# Patient Record
Sex: MALE | Race: WHITE | Employment: FULL TIME | URBAN - METROPOLITAN AREA
[De-identification: names, ages, dates, MRNs, and addresses within clinical notes are randomized per-mention and may not be internally consistent; named-entity substitution may affect disease eponyms.]

---

## 2017-03-20 NOTE — PROGRESS NOTES
SUBJECTIVE:                                                    Navin Thomas is a 21 year old male who presents to clinic today for the following health issues:    ENT Symptoms             Symptoms: cc Present Absent Comment   Fever/Chills  x     Fatigue  x     Muscle Aches  x     Eye Irritation   x    Sneezing   x    Nasal Chava/Drg  x     Sinus Pressure/Pain  x     Loss of smell  x     Dental pain  x     Sore Throat  x     Swollen Glands  x     Ear Pain/Fullness   x    Cough  x     Wheeze   x    Chest Pain   x    Shortness of breath   x    Rash   x    Other   x      Symptom duration:  x 2 days   Symptom severity:  mild   Treatments tried:  none   Contacts:  none       Only very mild cough.   Not eating much.  Drinking hurts even. Taking ibuprofen.   Only low grade fevers.     No history of mono.   Only mild body aches.   Does have a runny nose.     No difficulty breathing.     Problem list and histories reviewed & adjusted, as indicated.  Additional history: as documented    There is no problem list on file for this patient.    History reviewed. No pertinent past surgical history.    Social History   Substance Use Topics     Smoking status: Never Smoker     Smokeless tobacco: Never Used     Alcohol use No     Family History   Problem Relation Age of Onset     Hypertension Father      Lipids Father      Hearing Loss Father      Hearing Loss Paternal Grandmother      Hearing Loss Paternal Grandfather          No current outpatient prescriptions on file.     Allergies   Allergen Reactions     Aloe Itching       ROS:  Constitutional, HEENT, cardiovascular, pulmonary, gi and gu systems are negative, except as otherwise noted.    OBJECTIVE:                                                    /84  Pulse 81  Temp 99.4  F (37.4  C) (Oral)  Ht 6' (1.829 m)  Wt 223 lb (101.2 kg)  SpO2 95%  BMI 30.24 kg/m2  Body mass index is 30.24 kg/(m^2).  GENERAL:  No acute distress.  Interacts appropriately.  Breathing without  difficulty.  Alert.  HEENT:  Tympanic membranes intact without effusion or erythema.  Oral mucosa moist. Posterior pharynx has erythema.  Posterior pharynx has no exudate. Tonsils are 3 plus and every edematous appearing. Uvula midline.   NECK:  Soft and supple.  without tenderness.  Without lymphadenopathy.  Normal range of motion.    CARDIAC:   Regular rate and rhythm.  No murmurs, rubs, or gallops.   PULMONARY: Clear to auscultation bilaterally.  No  wheezes, crackles, or rhonchi.  Normal air exchange/breath sounds.  No use of accessory muscles.    PSYCH: Normal affect.  SKIN: No rashes.        Results for orders placed or performed in visit on 03/21/17 (from the past 24 hour(s))   Strep, Rapid Screen   Result Value Ref Range    Specimen Description Throat     Rapid Strep A Screen (A)      POSITIVE: Group A Streptococcal antigen detected by immunoassay.    Micro Report Status FINAL 03/21/2017           ASSESSMENT/PLAN:                                                    ASSESSMENT / PLAN:  (R07.0) Throat pain  (primary encounter diagnosis)  Comment:   Plan: Strep, Rapid Screen, predniSONE (DELTASONE) 20         MG tablet            (J02.0) Streptococcal pharyngitis  Comment:   Plan: amoxicillin (AMOXIL) 875 MG tablet, predniSONE         (DELTASONE) 20 MG tablet            To emergency room with difficulty breathing    Take with food. Side effects discussed.  Call with worsening symptoms or if no improvement in 5 days.  Analgesics for pain with food as needed.      Shanice Villagran PA-C  [facility]

## 2017-03-21 ENCOUNTER — OFFICE VISIT (OUTPATIENT)
Dept: FAMILY MEDICINE | Facility: CLINIC | Age: 22
End: 2017-03-21
Payer: COMMERCIAL

## 2017-03-21 VITALS
OXYGEN SATURATION: 95 % | DIASTOLIC BLOOD PRESSURE: 84 MMHG | SYSTOLIC BLOOD PRESSURE: 134 MMHG | BODY MASS INDEX: 30.2 KG/M2 | WEIGHT: 223 LBS | HEIGHT: 72 IN | TEMPERATURE: 99.4 F | HEART RATE: 81 BPM

## 2017-03-21 DIAGNOSIS — J02.0 STREPTOCOCCAL PHARYNGITIS: ICD-10-CM

## 2017-03-21 DIAGNOSIS — R07.0 THROAT PAIN: Primary | ICD-10-CM

## 2017-03-21 LAB
DEPRECATED S PYO AG THROAT QL EIA: ABNORMAL
MICRO REPORT STATUS: ABNORMAL
SPECIMEN SOURCE: ABNORMAL

## 2017-03-21 PROCEDURE — 99213 OFFICE O/P EST LOW 20 MIN: CPT | Performed by: PHYSICIAN ASSISTANT

## 2017-03-21 PROCEDURE — 87880 STREP A ASSAY W/OPTIC: CPT | Performed by: PHYSICIAN ASSISTANT

## 2017-03-21 RX ORDER — AMOXICILLIN 875 MG
875 TABLET ORAL 2 TIMES DAILY
Qty: 20 TABLET | Refills: 0 | Status: SHIPPED | OUTPATIENT
Start: 2017-03-21 | End: 2017-04-17

## 2017-03-21 RX ORDER — PREDNISONE 20 MG/1
40 TABLET ORAL DAILY
Qty: 10 TABLET | Refills: 0 | Status: SHIPPED | OUTPATIENT
Start: 2017-03-21 | End: 2017-03-26

## 2017-03-21 NOTE — NURSING NOTE
Chief Complaint   Patient presents with     URI     cold SX per pt x 2 days now        Initial /84  Pulse 81  Temp 99.4  F (37.4  C) (Oral)  Ht 6' (1.829 m)  Wt 223 lb (101.2 kg)  SpO2 95%  BMI 30.24 kg/m2 Estimated body mass index is 30.24 kg/(m^2) as calculated from the following:    Height as of this encounter: 6' (1.829 m).    Weight as of this encounter: 223 lb (101.2 kg).  Medication Reconciliation: complete      Tulio Antunez MA

## 2017-03-21 NOTE — MR AVS SNAPSHOT
"              After Visit Summary   3/21/2017    Navin Thomas    MRN: [id number]           Patient Information     Date Of Birth          1995        Visit Information        Provider Department      3/21/2017 9:20 AM Shanice Villagran PA-C [facility]        Today's Diagnoses     Throat pain    -  1    Streptococcal pharyngitis           Follow-ups after your visit        Who to contact     If you have questions or need follow up information about today's clinic visit or your schedule please contact [facility] directly at [phone].  Normal or non-critical lab and imaging results will be communicated to you by [facility] Labshart, letter or phone within 4 business days after the clinic has received the results. If you do not hear from us within 7 days, please contact the clinic through Th[facility] Labshart or phone. If you have a critical or abnormal lab result, we will notify you by phone as soon as possible.  Submit refill requests through GameHuddle or call your pharmacy and they will forward the refill request to us. Please allow 3 business days for your refill to be completed.          Additional Information About Your Visit        MyChart Information     GameHuddle lets you send messages to your doctor, view your test results, renew your prescriptions, schedule appointments and more. To sign up, go to [email or web address] . Click on \"Log in\" on the left side of the screen, which will take you to the Welcome page. Then click on \"Sign up Now\" on the right side of the page.     You will be asked to enter the access code listed below, as well as some personal information. Please follow the directions to create your username and password.     Your access code is: [email or web address]  Expires: 2017  9:53 AM     Your access code will  in 90 days. If you need help or a new code, please call your [facility] or [phone].        Care EveryWhere ID     This is your Care EveryWhere ID. " This could be used by other organizations to access your [place] medical records  [id number]        Your Vitals Were     Pulse Temperature Height Pulse Oximetry BMI (Body Mass Index)       81 99.4  F (37.4  C) (Oral) 6' (1.829 m) 95% 30.24 kg/m2        Blood Pressure from Last 3 Encounters:   03/21/17 134/84   09/20/16 (!) 145/91   12/21/15 130/80    Weight from Last 3 Encounters:   03/21/17 223 lb (101.2 kg)   09/20/16 217 lb (98.4 kg)   12/21/15 238 lb (108 kg)              We Performed the Following     Strep, Rapid Screen          Today's Medication Changes          These changes are accurate as of: 3/21/17  9:53 AM.  If you have any questions, ask your nurse or doctor.               Start taking these medicines.        Dose/Directions    amoxicillin 875 MG tablet   Commonly known as:  AMOXIL   Used for:  Streptococcal pharyngitis   Started by:  Shanice Villagran PA-C        Dose:  875 mg   Take 1 tablet (875 mg) by mouth 2 times daily   Quantity:  20 tablet   Refills:  0       predniSONE 20 MG tablet   Commonly known as:  DELTASONE   Used for:  Streptococcal pharyngitis, Throat pain   Started by:  Shanice Villagran PA-C        Dose:  40 mg   Take 2 tablets (40 mg) by mouth daily for 5 days In the morning. With food.   Quantity:  10 tablet   Refills:  0            Where to get your medicines      These medications were sent to [facility] [address] [address]  [address] [address]     Phone:  [phone]     amoxicillin 875 MG tablet    predniSONE 20 MG tablet                Primary Care Provider Office Phone # Fax #    [facility] [phone] [address] [facility]. [place] [address]        Thank you!     Thank you for choosing [facility]  for your care. Our goal is always to provide you with excellent care. Hearing back from our patients is one way we can continue to improve our  services. Please take a few minutes to complete the written survey that you may receive in the mail after your visit with us. Thank you!             Your Updated Medication List - Protect others around you: Learn how to safely use, store and throw away your medicines at www.disposemymeds.org.          This list is accurate as of: 3/21/17  9:53 AM.  Always use your most recent med list.                   Brand Name Dispense Instructions for use    amoxicillin 875 MG tablet    AMOXIL    20 tablet    Take 1 tablet (875 mg) by mouth 2 times daily       predniSONE 20 MG tablet    DELTASONE    10 tablet    Take 2 tablets (40 mg) by mouth daily for 5 days In the morning. With food.

## 2017-04-17 ENCOUNTER — RADIANT APPOINTMENT (OUTPATIENT)
Dept: GENERAL RADIOLOGY | Facility: CLINIC | Age: 22
End: 2017-04-17
Attending: PHYSICIAN ASSISTANT
Payer: COMMERCIAL

## 2017-04-17 ENCOUNTER — OFFICE VISIT (OUTPATIENT)
Dept: FAMILY MEDICINE | Facility: CLINIC | Age: 22
End: 2017-04-17
Payer: COMMERCIAL

## 2017-04-17 VITALS
SYSTOLIC BLOOD PRESSURE: 132 MMHG | HEART RATE: 68 BPM | TEMPERATURE: 98.2 F | BODY MASS INDEX: 30.79 KG/M2 | OXYGEN SATURATION: 97 % | DIASTOLIC BLOOD PRESSURE: 81 MMHG | WEIGHT: 227 LBS

## 2017-04-17 DIAGNOSIS — J30.1 SEASONAL ALLERGIC RHINITIS DUE TO POLLEN: ICD-10-CM

## 2017-04-17 DIAGNOSIS — M25.562 LEFT KNEE PAIN, UNSPECIFIED CHRONICITY: Primary | ICD-10-CM

## 2017-04-17 PROCEDURE — 73562 X-RAY EXAM OF KNEE 3: CPT | Mod: LT

## 2017-04-17 PROCEDURE — 99214 OFFICE O/P EST MOD 30 MIN: CPT | Performed by: PHYSICIAN ASSISTANT

## 2017-04-17 RX ORDER — FEXOFENADINE HCL AND PSEUDOEPHEDRINE HCL 180; 240 MG/1; MG/1
1 TABLET, EXTENDED RELEASE ORAL DAILY
Qty: 30 TABLET | Refills: 0 | Status: SHIPPED | OUTPATIENT
Start: 2017-04-17 | End: 2018-05-21

## 2017-04-17 ASSESSMENT — PAIN SCALES - GENERAL: PAINLEVEL: NO PAIN (1)

## 2017-04-17 NOTE — MR AVS SNAPSHOT
After Visit Summary   4/17/2017    Navin Thomas    MRN: [id number]           Patient Information     Date Of Birth          1995        Visit Information        Provider Department      4/17/2017 8:20 AM Angélica Hernandez PA-C [facility]        Today's Diagnoses     Left knee pain, unspecified chronicity    -  1    Seasonal allergic rhinitis due to pollen          Care Instructions    Rest, ice, and elevate the left knee multiple times daily for the next 2-3 days.     Then, slowly increase activities as tolerated. If something causes you pain, you are doing too much.    Alternate motrin and tylenol as needed for discomfort.    Follow up with orthopedics for further evaluation of your knee pain. Sooner if any worsening of symptoms.    Follow up with allergist for further evaluation of your allergy symptoms. Recommend using flonase as directed over the counter daily to help minimize symptoms.              Follow-ups after your visit        Additional Services     ALLERGY/ASTHMA ADULT REFERRAL       Your provider has referred you to: FMG: [facility]  [phone] [email or web address]://[email or web address].[facility]/[place]/  FHN: [facility] - [place] [phone]  Fax:  [phone] [email or web address]  FHN: [facility] [place] [phone]   [email or web address]  [place] [phone]   http://[email or web address]  [place] [phone]   http://[email or web address]  FHN: [facility] [place] [phone]   https://www.[email or web address].net/  [place] [phone]   [email or web address]  FHN: [facility] - [place] [phone]   http://[email or web address]  [place] [phone]   http://Light Extraction  [place] [phone]   [email or web address]    Please be aware that coverage of these services is subject to the terms and  limitations of your health insurance plan.  Call member services at your health plan with any benefit or coverage questions.      Please bring the following with you to your appointment:    (1) Any X-Rays, CTs or MRIs which have been performed.  Contact the facility where they were done to arrange for  prior to your scheduled appointment.    (2) List of current medications  (3) This referral request   (4) Any documents/labs given to you for this referral            ORTHOPEDICS ADULT REFERRAL       Your provider has referred you to: FMG: [facility] - [facility] [phone]   http://www.[place].[facility]/  [facility] [phone]   http://www.[place].[facility]/  UM: Orthopaedic [facility] [phone]   http://www.[facility].org/Clinics/orthopaedic-clinic/    UMP: Sports Medicine [facility] [phone]   http://www.[facility].org/specialties/sports-medicine/  [facility] [phone]   https://[email or web address].[email or web address]/[facility]    Please be aware that coverage of these services is subject to the terms and limitations of your health insurance plan.  Call member services at your health plan with any benefit or coverage questions.      Please bring the following to your appointment:    >>   Any x-rays, CTs or MRIs which have been performed.  Contact the facility where they were done to arrange for  prior to your scheduled appointment.    >>   List of current medications   >>   This referral request   >>   Any documents/labs given to you for this referral                  Who to contact     If you have questions or need follow up information about today's clinic visit or your schedule please contact [facility] directly at [phone].  Normal or non-critical lab and imaging  results will be communicated to you by MyChart, letter or phone within 4 business days after the clinic has received the results. If you do not hear from us within 7 days, please contact the clinic through MyChart or phone. If you have a critical or abnormal lab result, we will notify you by phone as soon as possible.  Submit refill requests through Printed Piece or call your pharmacy and they will forward the refill request to us. Please allow 3 business days for your refill to be completed.          Additional Information About Your Visit        Care EveryWhere ID     This is your Care EveryWhere ID. This could be used by other organizations to access your [place] medical records  [id number]        Your Vitals Were     Pulse Temperature Pulse Oximetry BMI (Body Mass Index)          68 98.2  F (36.8  C) (Oral) 97% 30.79 kg/m2         Blood Pressure from Last 3 Encounters:   04/17/17 132/81   03/21/17 134/84   09/20/16 (!) 145/91    Weight from Last 3 Encounters:   04/17/17 227 lb (103 kg)   03/21/17 223 lb (101.2 kg)   09/20/16 217 lb (98.4 kg)              We Performed the Following     ALLERGY/ASTHMA ADULT REFERRAL     ORTHOPEDICS ADULT REFERRAL     XR Knee Left 3 Views          Today's Medication Changes          These changes are accurate as of: 4/17/17  8:48 AM.  If you have any questions, ask your nurse or doctor.               Start taking these medicines.        Dose/Directions    fexofenadine-pseudoePHEDrine 180-240 MG per 24 hr tablet   Commonly known as:  ALLEGRA-D 24   Used for:  Seasonal allergic rhinitis due to pollen   Started by:  Angélica Hernandez PA-C        Dose:  1 tablet   Take 1 tablet by mouth daily   Quantity:  30 tablet   Refills:  0         Stop taking these medicines if you haven't already. Please contact your care team if you have questions.     amoxicillin 875 MG tablet   Commonly known as:  AMOXIL   Stopped by:  Angélica Hernandez PA-C                Where to get your medicines       Some of these will need a paper prescription and others can be bought over the counter.  Ask your nurse if you have questions.     Bring a paper prescription for each of these medications     fexofenadine-pseudoePHEDrine 180-240 MG per 24 hr tablet                Primary Care Provider Office Phone # Fax #    [facility] [phone] [address] [facility] [address]        Thank you!     Thank you for choosing [facility]  for your care. Our goal is always to provide you with excellent care. Hearing back from our patients is one way we can continue to improve our services. Please take a few minutes to complete the written survey that you may receive in the mail after your visit with us. Thank you!             Your Updated Medication List - Protect others around you: Learn how to safely use, store and throw away your medicines at www.disposemymeds.org.          This list is accurate as of: 4/17/17  8:48 AM.  Always use your most recent med list.                   Brand Name Dispense Instructions for use    fexofenadine-pseudoePHEDrine 180-240 MG per 24 hr tablet    ALLEGRA-D 24    30 tablet    Take 1 tablet by mouth daily

## 2017-04-17 NOTE — NURSING NOTE
Chief Complaint   Patient presents with     Knee Pain     left knee pain off and on 6 years - getting worse       Initial /81  Pulse 68  Temp 98.2  F (36.8  C) (Oral)  Wt 227 lb (103 kg)  SpO2 97%  BMI 30.79 kg/m2 Estimated body mass index is 30.79 kg/(m^2) as calculated from the following:    Height as of 3/21/17: 6' (1.829 m).    Weight as of this encounter: 227 lb (103 kg).  Medication Reconciliation: complete  Katharina Coelho M.A.

## 2017-04-17 NOTE — PROGRESS NOTES
SUBJECTIVE:                                                    Navin Thomas is a 21 year old male who presents to clinic today for the following health issues:        Musculoskeletal problem/pain      Duration: left knee pain x 6 years     Description  Location:  Left knee    Intensity:  moderate, severe    Accompanying signs and symptoms: none    History  Previous similar problem: YES  Previous evaluation:  MRI    Precipitating or alleviating factors:  Trauma or overuse: YES-   Aggravating factors include: climbing stairs, lifting, exercise and running    Therapies tried and outcome: ice and PT - 3 years ago     MRI and patient 3 years ago. Symptoms worsening since 3 years ago.     Locates pain to the medial aspect of the knee.  With range of motion he feels clicking, popping, and grinding sensation. Denies the knee locking place. Continues with full range of motion. Intermittently limping due to pain.   First injury - forgot to pivot foot and bearing weight quickly - 6 years ago.  States he is re-injuring the knee every 2-6 months.  Most recently injured 3 months ago - roommate bumped into knee and caused him to fall down to his knees.  He will have intermittent swelling. Did have slight ecchymosis after most recent injury. Denies erythema  Denies numbness and tingling.     He would like a refill of Allegra-D. States this works the best to control his allergy symptoms (nasal congestion and itchy eyes). He has not seen an allergist. States he is allergic to pollen. He has tried Claritin in the past, but this failed over time for him. Discussed concerns for taking a decongestant daily. Recommended follow up with allergist for further evaluation and care of his symptoms.         Problem list and histories reviewed & adjusted, as indicated.  Additional history: as documented    There is no problem list on file for this patient.    No past surgical history on file.    Social History   Substance Use Topics      Smoking status: Never Smoker     Smokeless tobacco: Never Used     Alcohol use No     Family History   Problem Relation Age of Onset     Hypertension Father      Lipids Father      Hearing Loss Father      Hearing Loss Paternal Grandmother      Hearing Loss Paternal Grandfather          Current Outpatient Prescriptions   Medication Sig Dispense Refill     fexofenadine-pseudoePHEDrine (ALLEGRA-D 24) 180-240 MG per 24 hr tablet Take 1 tablet by mouth daily 30 tablet 0     Allergies   Allergen Reactions     Aloe Itching     BP Readings from Last 3 Encounters:   04/17/17 132/81   03/21/17 134/84   09/20/16 (!) 145/91    Wt Readings from Last 3 Encounters:   04/17/17 227 lb (103 kg)   03/21/17 223 lb (101.2 kg)   09/20/16 217 lb (98.4 kg)                    Reviewed and updated as needed this visit by clinical staff  Allergies       Reviewed and updated as needed this visit by Provider         ROS:  Constitutional, musculoskeletal and integumentary systems are negative, except as otherwise noted.    OBJECTIVE:                                                    /81  Pulse 68  Temp 98.2  F (36.8  C) (Oral)  Wt 227 lb (103 kg)  SpO2 97%  BMI 30.79 kg/m2  Body mass index is 30.79 kg/(m^2).  GENERAL: healthy, alert and no distress  HENT: normal cephalic/atraumatic, ear canals and TM's normal, nose and mouth without ulcers or lesions, oropharynx clear and oral mucous membranes moist  MS: Left Knee - mild tenderness to palpation along the anterior-medial aspect, minimal swelling, good range of motion with mild pain at full extension, negative anterior and posterior drawer, minimal pain but no laxity with valgus stress, increased pain with thessaly test, no pain or laxity with varus stress, ambulating without a limp, normal sensation and capillary refill  SKIN: no suspicious lesions or rashes    Diagnostic Test Results:  Results for orders placed or performed in visit on 04/17/17 (from the past 24 hour(s))   XR Knee  Left 3 Views    Narrative    LEFT KNEE THREE VIEWS  4/17/2017 8:44 AM     HISTORY: Pain in left knee.    COMPARISON: None.      Impression    IMPRESSION: Bones are normally aligned. Trace left knee effusion. No  acute fracture. Well-corticated ossicles are noted at the medial  aspect of the patella, perhaps incidental, or perhaps related to old  injury. No evidence of acute fracture. There may be minimal associated  edema, clinically correlate with pain localized to this region.    TERESITA LEMON MD         ASSESSMENT/PLAN:                                                        ICD-10-CM    1. Left knee pain, unspecified chronicity M25.562 XR Knee Left 3 Views     ORTHOPEDICS ADULT REFERRAL   2. Seasonal allergic rhinitis due to pollen J30.1 fexofenadine-pseudoePHEDrine (ALLEGRA-D 24) 180-240 MG per 24 hr tablet     ALLERGY/ASTHMA ADULT REFERRAL       Patient Instructions   Rest, ice, and elevate the left knee multiple times daily for the next 2-3 days.     Then, slowly increase activities as tolerated. If something causes you pain, you are doing too much.    Alternate motrin and tylenol as needed for discomfort.    Follow up with orthopedics for further evaluation of your knee pain. Sooner if any worsening of symptoms.    Follow up with allergist for further evaluation of your allergy symptoms. Recommend using flonase as directed over the counter daily to help minimize symptoms.            Angélica Hernandez PA-C  [facility]

## 2017-04-17 NOTE — PATIENT INSTRUCTIONS
Rest, ice, and elevate the left knee multiple times daily for the next 2-3 days.     Then, slowly increase activities as tolerated. If something causes you pain, you are doing too much.    Alternate motrin and tylenol as needed for discomfort.    Follow up with orthopedics for further evaluation of your knee pain. Sooner if any worsening of symptoms.    Follow up with allergist for further evaluation of your allergy symptoms. Recommend using flonase as directed over the counter daily to help minimize symptoms.

## 2017-04-18 ENCOUNTER — PRE VISIT (OUTPATIENT)
Dept: ORTHOPEDICS | Facility: CLINIC | Age: 22
End: 2017-04-18

## 2017-04-18 NOTE — TELEPHONE ENCOUNTER
1.  Date/reason for appt: 4/24/17 -- left knee pain  2.  Referring provider: Angélica Hernandez  3.  Call to patient (Yes / No - short description): no, referred  4.  Previous care at / records requested from: [facility] -- records and imaging in epic/pacs.   5.  Other: Per Dr. Hernandez's note, pt has had MRI in the past.  Called and spoke to pt, he has not been seen for this anywhere except for [facility].  Closing encounter.

## 2017-04-20 ENCOUNTER — TRANSFERRED RECORDS (OUTPATIENT)
Dept: HEALTH INFORMATION MANAGEMENT | Facility: CLINIC | Age: 22
End: 2017-04-20

## 2017-04-24 ENCOUNTER — OFFICE VISIT (OUTPATIENT)
Dept: ORTHOPEDICS | Facility: CLINIC | Age: 22
End: 2017-04-24

## 2017-04-24 VITALS
SYSTOLIC BLOOD PRESSURE: 141 MMHG | OXYGEN SATURATION: 99 % | WEIGHT: 230.6 LBS | HEIGHT: 72 IN | BODY MASS INDEX: 31.23 KG/M2 | HEART RATE: 78 BPM | DIASTOLIC BLOOD PRESSURE: 88 MMHG

## 2017-04-24 DIAGNOSIS — M25.562 ACUTE PAIN OF LEFT KNEE: Primary | ICD-10-CM

## 2017-04-24 ASSESSMENT — ENCOUNTER SYMPTOMS
STIFFNESS: 0
BACK PAIN: 0
MUSCLE CRAMPS: 0
NECK PAIN: 0
ARTHRALGIAS: 1
MYALGIAS: 0
JOINT SWELLING: 1
MUSCLE WEAKNESS: 1

## 2017-04-24 NOTE — NURSING NOTE
"Reason For Visit:   Chief Complaint   Patient presents with     Consult     Pt. states that he is here today for Left Knee Pain. DOI: 6 years ago, martial arts. He had tried Physical Therapy in the past, affected for couple of days. Referring:  JESSY ISAAC       Pain Assessment  Patient Currently in Pain: No  0-10 Pain Scale: 0  Primary Pain Location: Knee  Pain Orientation: Left               HEIGHT: 5' 11.5\", WEIGHT: 230 lbs 9.6 oz, BMI: Body mass index is 31.71 kg/(m^2).      Current Outpatient Prescriptions   Medication Sig Dispense Refill     fexofenadine-pseudoePHEDrine (ALLEGRA-D 24) 180-240 MG per 24 hr tablet Take 1 tablet by mouth daily 30 tablet 0          Allergies   Allergen Reactions     Aloe Itching     "

## 2017-04-24 NOTE — PROGRESS NOTES
"Subjective  Navin Thomas is a 21 year old male who presents with the complaint of   L knee pain injured dancing 6 weeks ago. Previous knee pain 6 years ago with matial arts and had an injury. Thought he has been subluxing his knee.  He continued to have episodes of pain every 3-6 months. Most recent 6 weeks ago. No pop. He gets pain after reinjuring the knee.    Pain and nonpainful clicking. + giving way episodes on the l leg with flexion.  Limiting from running, also limited weightbearing on his L leg.    Activities:  School at [employer].        Past Medical and Surgical History  Past Medical History:   Diagnosis Date     NO ACTIVE PROBLEMS             Allergies   Allergen Reactions     Aloe Itching     Current Outpatient Prescriptions   Medication Sig Dispense Refill     fexofenadine-pseudoePHEDrine (ALLEGRA-D 24) 180-240 MG per 24 hr tablet Take 1 tablet by mouth daily 30 tablet 0     Family Hx remarkable for   Navin Thomas does not use tobacco      ROS:  Constitutional no fevers sweats or chills  Eyes no vision change  Respiratory, no sob cough wheezing,   Cardiovascular no syncope, chest pain or palpitaitons,   Gastroenterology, no nausea, vomiting or abd pain, no stool incontinence  Genitourinary, no dysuria, no  Frequency, no urinary incontinence, no urinary retention  Integumentary-no recent rashes  Musculoskeletal see HPI otherwise negative  Psychiatric no depression or anxiety  Heme-no abnormal bleeding      Objective  /88  Pulse 78  Ht 5' 11.5\" (1.816 m)  Wt 230 lb 9.6 oz (104.6 kg)  SpO2 99%  BMI 31.71 kg/m2      left  Knee Exam  No joint effusion, No redness;Tenderness medial joint line ;ROM unremarkable; Strength 5/5 ext, 5/5 flexion, +  pain ,no  Opening at medial joint line with varus or neg pain/opening with valgus stress test;  Lachmans with less firm endpoint of L than R; painful McMurrays medial joint line; pos Squat test medial joint line, no click; no pf facet " tenderness, perhaps some tenderness at the prox patellar tendon and under the distal patella, but not the pain he has been having.   2 quadrant of medial and lateral patellar glide with neg patellar apprehension test, Neg pseudocompression test, hip IR/ER did not cause pain    Assessment  Chronic L knee pain with repetitive episodes of medial joint pain/giving way. No true mechanical symptoms , but some recurrent swelling. Symptoms are worsening despite the fact he has limited activity, stopped running or sports and gained weight. He would like to be more active.    Plan  --requested MRI from  3 years ago [facility] for comparison  --given worsened symptoms and swelling, neg radiographs, ordered L knee MRI to evaluate for medial meniscal tear and ACL integrity. Differential includes PF pain, OCD knee, chondral lesion.  He will f/u post imaging. He is using a hinged knee brace, ice, activity modification, gentle ROM in the mean time.    Judson Delgado MD CAQ        Judson Delgado MD CAQ

## 2017-04-24 NOTE — LETTER
"4/24/2017       RE: Navin Thomas  [address] [address] [facility] [address]     Dear Colleague,    Thank you for referring your patient, Navin Thomas, to the [facility] at [facility]. Please see a copy of my visit note below.    Subjective  Navin Thomas is a 21 year old male who presents with the complaint of   L knee pain injured dancing 6 weeks ago. Previous knee pain 6 years ago with matial arts and had an injury. Thought he has been subluxing his knee.  He continued to have episodes of pain every 3-6 months. Most recent 6 weeks ago. No pop. He gets pain after reinjuring the knee.    Pain and nonpainful clicking. + giving way episodes on the l leg with flexion.  Limiting from running, also limited weightbearing on his L leg.    Activities:  School at [employer].        Past Medical and Surgical History  Past Medical History:   Diagnosis Date     NO ACTIVE PROBLEMS             Allergies   Allergen Reactions     Aloe Itching     Current Outpatient Prescriptions   Medication Sig Dispense Refill     fexofenadine-pseudoePHEDrine (ALLEGRA-D 24) 180-240 MG per 24 hr tablet Take 1 tablet by mouth daily 30 tablet 0     Family Hx remarkable for   Navin Thomas does not use tobacco      ROS:  Constitutional no fevers sweats or chills  Eyes no vision change  Respiratory, no sob cough wheezing,   Cardiovascular no syncope, chest pain or palpitaitons,   Gastroenterology, no nausea, vomiting or abd pain, no stool incontinence  Genitourinary, no dysuria, no  Frequency, no urinary incontinence, no urinary retention  Integumentary-no recent rashes  Musculoskeletal see HPI otherwise negative  Psychiatric no depression or anxiety  Heme-no abnormal bleeding      Objective  /88  Pulse 78  Ht 5' 11.5\" (1.816 m)  Wt 230 lb 9.6 oz (104.6 kg)  SpO2 99%  BMI 31.71 kg/m2      left  Knee Exam  No joint effusion, No redness;Tenderness medial joint line ;ROM " unremarkable; Strength 5/5 ext, 5/5 flexion, +  pain ,no  Opening at medial joint line with varus or neg pain/opening with valgus stress test;  Lachmans with less firm endpoint of L than R; painful McMurrays medial joint line; pos Squat test medial joint line, no click; no pf facet tenderness, perhaps some tenderness at the prox patellar tendon and under the distal patella, but not the pain he has been having.   2 quadrant of medial and lateral patellar glide with neg patellar apprehension test, Neg pseudocompression test, hip IR/ER did not cause pain    Assessment  Chronic L knee pain with repetitive episodes of medial joint pain/giving way. No true mechanical symptoms , but some recurrent swelling. Symptoms are worsening despite the fact he has limited activity, stopped running or sports and gained weight. He would like to be more active.    Plan  --requested MRI from  3 years ago [facility] for comparison  --given worsened symptoms and swelling, neg radiographs, ordered L knee MRI to evaluate for medial meniscal tear and ACL integrity. Differential includes PF pain, OCD knee, chondral lesion.  He will f/u post imaging. He is using a hinged knee brace, ice, activity modification, gentle ROM in the mean time.    Judson Delgado MD CAQ        Judson Delgado MD CAQ      Again, thank you for allowing me to participate in the care of your patient.      Sincerely,    Judson Delgado MD

## 2017-04-24 NOTE — MR AVS SNAPSHOT
"              After Visit Summary   4/24/2017    Navin Thomas    MRN: [id number]           Patient Information     Date Of Birth          1995        Visit Information        Provider Department      4/24/2017 4:00 PM Judson Delgado MD [facility]        Today's Diagnoses     Acute pain of left knee    -  1       Follow-ups after your visit        Future tests that were ordered for you today     Open Future Orders        Priority Expected Expires Ordered    MRI Lower extremity joint w/o contrast LT* Routine  4/24/2018 4/24/2017            Who to contact     Please call your clinic at [phone] to:    Ask questions about your health    Make or cancel appointments    Discuss your medicines    Learn about your test results    Speak to your doctor   If you have compliments or concerns about an experience at your clinic, or if you wish to file a complaint, please contact [facility] Patient Relations at [phone] or email us at [email or web address]         Additional Information About Your Visit        Care EveryWhere ID     This is your Care EveryWhere ID. This could be used by other organizations to access your [place] medical records  [id number]        Your Vitals Were     Pulse Height Pulse Oximetry BMI (Body Mass Index)          78 5' 11.5\" (1.816 m) 99% 31.71 kg/m2         Blood Pressure from Last 3 Encounters:   04/24/17 141/88   04/17/17 132/81   03/21/17 134/84    Weight from Last 3 Encounters:   04/24/17 230 lb 9.6 oz (104.6 kg)   04/17/17 227 lb (103 kg)   03/21/17 223 lb (101.2 kg)               Primary Care Provider Office Phone # Fax #    [facility] [phone]       [address] [facility] [address]        Thank you!     Thank you for choosing [facility]  for your care. Our goal is always to provide you with excellent care. Hearing back from our patients is one way we can " continue to improve our services. Please take a few minutes to complete the written survey that you may receive in the mail after your visit with us. Thank you!             Your Updated Medication List - Protect others around you: Learn how to safely use, store and throw away your medicines at www.disposemymeds.org.          This list is accurate as of: 4/24/17  4:21 PM.  Always use your most recent med list.                   Brand Name Dispense Instructions for use    fexofenadine-pseudoePHEDrine 180-240 MG per 24 hr tablet    ALLEGRA-D 24    30 tablet    Take 1 tablet by mouth daily

## 2017-05-01 ENCOUNTER — OFFICE VISIT (OUTPATIENT)
Dept: ORTHOPEDICS | Facility: CLINIC | Age: 22
End: 2017-05-01

## 2017-05-01 VITALS — WEIGHT: 230 LBS | HEIGHT: 72 IN | BODY MASS INDEX: 31.15 KG/M2

## 2017-05-01 DIAGNOSIS — M25.362 KNEE INSTABILITY, LEFT: Primary | ICD-10-CM

## 2017-05-01 DIAGNOSIS — M22.42 CHONDROMALACIA OF PATELLA, LEFT: ICD-10-CM

## 2017-05-01 NOTE — MR AVS SNAPSHOT
After Visit Summary   5/1/2017    Navin Thomas    MRN: [id number]           Patient Information     Date Of Birth          1995        Visit Information        Provider Department      5/1/2017 11:45 AM Judson Delgado MD [facility]        Today's Diagnoses     Knee instability, left    -  1    Chondromalacia of patella, left          Care Instructions    L knee patellofemoral instability  L kneecap cartilage changes    Plan  --continue orthotics, if needed we can prescribe a custom orthotic as well  --we discussed cycling for weight loss and fitness, if more pain or instability, we can discuss other options. For weight loss lower intensity exercise for > 40 minutes 5 x a week can be helpful. If issues, consider bike fit  --PT referral to Indira Bro for pelvifemoral strengthening  --Consultation with Orthopedic surgery given multiple episodes to decide if surgical options should be pursued.    Judson Delgado MD CAQ          Follow-ups after your visit        Additional Services     ORTHOPEDICS ADULT REFERRAL       Your provider has referred you to: [email or web address]] Ortho L patellar instability--recurrent, next available Dr. Restrepo or Amebr.    Please be aware that coverage of these services is subject to the terms and limitations of your health insurance plan.  Call member services at your health plan with any benefit or coverage questions.      Please bring the following to your appointment:    >>   Any x-rays, CTs or MRIs which have been performed.  Contact the facility where they were done to arrange for  prior to your scheduled appointment.    >>   List of current medications   >>   This referral request   >>   Any documents/labs given to you for this referral            PHYSICAL THERAPY REFERRAL (Internal)       Physical Therapy Referral                  Your next 10 appointments already scheduled     May 16, 2017 10:00 AM CDT   (Arrive by 9:45 AM)   New  "Patient Visit with Jake Restreop MD   [facility] ([facility])    [address]  [address] [facility] [address] [phone]              Who to contact     Please call your clinic at [phone] to:    Ask questions about your health    Make or cancel appointments    Discuss your medicines    Learn about your test results    Speak to your doctor   If you have compliments or concerns about an experience at your clinic, or if you wish to file a complaint, please contact [facility] Patient Relations at [phone] or email us at [email or web address]         Additional Information About Your Visit        Care EveryWhere ID     This is your Care EveryWhere ID. This could be used by other organizations to access your [place] medical records  [id number]        Your Vitals Were     Height BMI (Body Mass Index)                5' 11.5\" (1.816 m) 31.63 kg/m2           Blood Pressure from Last 3 Encounters:   04/24/17 141/88   04/17/17 132/81   03/21/17 134/84    Weight from Last 3 Encounters:   05/01/17 230 lb (104.3 kg)   04/24/17 230 lb 9.6 oz (104.6 kg)   04/17/17 227 lb (103 kg)              We Performed the Following     ORTHOPEDICS ADULT REFERRAL     PHYSICAL THERAPY REFERRAL (Internal)        Primary Care Provider Office Phone # Fax #    [facility] [phone] [address] [facility] [address]        Thank you!     Thank you for choosing [facility]  for your care. Our goal is always to provide you with excellent care. Hearing back from our patients is one way we can continue to improve our services. Please take a few minutes to complete the written survey that you may receive in the mail after your visit with us. Thank you!             Your Updated Medication List - Protect others around you: Learn how to safely use, store and throw away your medicines at " www.disposemymeds.org.          This list is accurate as of: 5/1/17 12:09 PM.  Always use your most recent med list.                   Brand Name Dispense Instructions for use    fexofenadine-pseudoePHEDrine 180-240 MG per 24 hr tablet    ALLEGRA-D 24    30 tablet    Take 1 tablet by mouth daily       fluticasone 27.5 MCG/SPRAY spray    VERAMYST     Spray 2 sprays into both nostrils daily

## 2017-05-01 NOTE — LETTER
"  5/1/2017      RE: Navin Thomas  [address] [address] [facility] [address]        Subjective:   Navin Thomas is a 21 year old male who is here following up post left knee MRI.     Date of injury: March 2016  Following Therapeutic Plan: Yes MRI 4/27/17  Pain: Worsening  Function: Unchanged  Interval History:      PAST MEDICAL, SOCIAL, SURGICAL AND FAMILY HISTORY: He  has a past medical history of NO ACTIVE PROBLEMS.  He  has no past surgical history on file.  His family history includes Hearing Loss in his father, paternal grandfather, and paternal grandmother; Hypertension in his father; Lipids in his father.  He reports that he has never smoked. He has never used smokeless tobacco. He reports that he does not drink alcohol or use illicit drugs.    ALLERGIES: He is allergic to aloe.    CURRENT MEDICATIONS: He has a current medication list which includes the following prescription(s): fluticasone and fexofenadine-pseudoephedrine.     REVIEW OF SYSTEMS: 3 point review of systems is negative except as noted above.     Exam:   Ht 5' 11.5\" (1.816 m)  Wt 230 lb (104.3 kg)  BMI 31.63 kg/m2           CONSTITUTIONIAL: healthy, alert and no distress    MUSCULOSKELETAL:     Left Knee Exam  No joint effusion, No redness;Tenderness at the pf facets;ROM unremarkable; Strength 5/5 ext, 5/5 flexion;No pain or opening with varus or valgus stress test; Neg Lachmans; painful McMurrays;  Neg patellar apprehension test,   hip IR/ER did not cause pain    L knee MRI reviewed with notable PF chondromalacia as well as evidence of previous MPFL avulsion     Assessment/Plan:   L knee recurrent instability  --PT likely was not optimal, as described quad strengthening. Referred to Indira Bro to discussed pelvifemoral strengthening.  --reviewed his frustration with multiple instabilty episodes limiting running, causing him to stop martial arts. Recommended given multiple episodes consultaiton with Dr. Vegas to discuss surgical " options.    Judson Delgado MD CAQ

## 2017-05-01 NOTE — PATIENT INSTRUCTIONS
L knee patellofemoral instability  L kneecap cartilage changes    Plan  --continue orthotics, if needed we can prescribe a custom orthotic as well  --we discussed cycling for weight loss and fitness, if more pain or instability, we can discuss other options. For weight loss lower intensity exercise for > 40 minutes 5 x a week can be helpful. If issues, consider bike fit  --PT referral to Indira Bro for pelvifemoral strengthening  --Consultation with Orthopedic surgery given multiple episodes to decide if surgical options should be pursued.    Judson Delgado MD CAQ

## 2017-05-01 NOTE — PROGRESS NOTES
" Subjective:   Navin Thomas is a 21 year old male who is here following up post left knee MRI.     Date of injury: March 2016  Following Therapeutic Plan: Yes MRI 4/27/17  Pain: Worsening  Function: Unchanged  Interval History:      PAST MEDICAL, SOCIAL, SURGICAL AND FAMILY HISTORY: He  has a past medical history of NO ACTIVE PROBLEMS.  He  has no past surgical history on file.  His family history includes Hearing Loss in his father, paternal grandfather, and paternal grandmother; Hypertension in his father; Lipids in his father.  He reports that he has never smoked. He has never used smokeless tobacco. He reports that he does not drink alcohol or use illicit drugs.    ALLERGIES: He is allergic to aloe.    CURRENT MEDICATIONS: He has a current medication list which includes the following prescription(s): fluticasone and fexofenadine-pseudoephedrine.     REVIEW OF SYSTEMS: 3 point review of systems is negative except as noted above.     Exam:   Ht 5' 11.5\" (1.816 m)  Wt 230 lb (104.3 kg)  BMI 31.63 kg/m2           CONSTITUTIONIAL: healthy, alert and no distress    MUSCULOSKELETAL:     Left Knee Exam  No joint effusion, No redness;Tenderness at the pf facets;ROM unremarkable; Strength 5/5 ext, 5/5 flexion;No pain or opening with varus or valgus stress test; Neg Lachmans; painful McMurrays;  Neg patellar apprehension test,   hip IR/ER did not cause pain    L knee MRI reviewed with notable PF chondromalacia as well as evidence of previous MPFL avulsion     Assessment/Plan:   L knee recurrent instability  --PT likely was not optimal, as described quad strengthening. Referred to Indira Bro to discussed pelvifemoral strengthening.  --reviewed his frustration with multiple instabilty episodes limiting running, causing him to stop martial arts. Recommended given multiple episodes consultaiton with Dr. Vegas to discuss surgical options.    Judson Delgado MD CAQ    "

## 2017-05-24 ENCOUNTER — THERAPY VISIT (OUTPATIENT)
Dept: PHYSICAL THERAPY | Facility: CLINIC | Age: 22
End: 2017-05-24
Payer: COMMERCIAL

## 2017-05-24 DIAGNOSIS — M23.52 RECURRENT LEFT KNEE INSTABILITY: Primary | ICD-10-CM

## 2017-05-24 PROCEDURE — 97112 NEUROMUSCULAR REEDUCATION: CPT | Mod: GP | Performed by: PHYSICAL THERAPIST

## 2017-05-24 PROCEDURE — 97161 PT EVAL LOW COMPLEX 20 MIN: CPT | Mod: GP | Performed by: PHYSICAL THERAPIST

## 2017-05-24 ASSESSMENT — ACTIVITIES OF DAILY LIVING (ADL)
HOW_WOULD_YOU_RATE_THE_CURRENT_FUNCTION_OF_YOUR_KNEE_DURING_YOUR_USUAL_DAILY_ACTIVITIES_ON_A_SCALE_FROM_0_TO_100_WITH_100_BEING_YOUR_LEVEL_OF_KNEE_FUNCTION_PRIOR_TO_YOUR_INJURY_AND_0_BEING_THE_INABILITY_TO_PERFORM_ANY_OF_YOUR_USUAL_DAILY_ACTIVITIES?: 75
STIFFNESS: I DO NOT HAVE THE SYMPTOM
SQUAT: ACTIVITY IS SOMEWHAT DIFFICULT
PAIN: I DO NOT HAVE THE SYMPTOM
GO DOWN STAIRS: ACTIVITY IS SOMEWHAT DIFFICULT
GO UP STAIRS: ACTIVITY IS SOMEWHAT DIFFICULT
STAND: ACTIVITY IS SOMEWHAT DIFFICULT
WALK: ACTIVITY IS MINIMALLY DIFFICULT
HOW_WOULD_YOU_RATE_THE_OVERALL_FUNCTION_OF_YOUR_KNEE_DURING_YOUR_USUAL_DAILY_ACTIVITIES?: NEARLY NORMAL
KNEEL ON THE FRONT OF YOUR KNEE: ACTIVITY IS SOMEWHAT DIFFICULT
AS_A_RESULT_OF_YOUR_KNEE_INJURY,_HOW_WOULD_YOU_RATE_YOUR_CURRENT_LEVEL_OF_DAILY_ACTIVITY?: NEARLY NORMAL
SWELLING: I DO NOT HAVE THE SYMPTOM
GIVING WAY, BUCKLING OR SHIFTING OF KNEE: I DO NOT HAVE THE SYMPTOM
WEAKNESS: I DO NOT HAVE THE SYMPTOM
SIT WITH YOUR KNEE BENT: ACTIVITY IS NOT DIFFICULT
RISE FROM A CHAIR: ACTIVITY IS MINIMALLY DIFFICULT
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
## 2017-05-24 NOTE — MR AVS SNAPSHOT
After Visit Summary   5/24/2017    Navin Thomas    MRN: [id number]           Patient Information     Date Of Birth          1995        Visit Information        Provider Department      5/24/2017 6:30 PM Indira Bro, PT [facility]        Today's Diagnoses     Recurrent left knee instability    -  1       Follow-ups after your visit        Your next 10 appointments already scheduled     May 30, 2017  9:00 AM CDT   (Arrive by 8:45 AM)   New Patient Visit with Jake Restrepo MD   [facility] ([facility])    [address] [facility] [address]   [phone]            Jun 06, 2017  9:30 AM CDT   NIHARIKA Extremity with Indira Bro PT   [facility] ([facility])    [address] [place] [address] [phone]            Jun 13, 2017  9:00 AM CDT   NIHARIKA Extremity with Yadira Monge PT   [facility] ([facility])    [address] [place] [address] [phone]              Who to contact     If you have questions or need follow up information about today's clinic visit or your schedule please contact [facility] directly at [phone].  Normal or non-critical lab and imaging results will be communicated to you by MyChart, letter or phone within 4 business days after the clinic has received the results. If you do not hear from us within 7 days, please contact the clinic through MyChart or phone. If you have a critical or abnormal lab result, we will notify you by phone as soon as possible.  Submit refill requests through Hubskip or call your pharmacy and they will forward the refill request to us. Please allow 3 business days for your refill to be completed.          Additional Information About Your Visit        Care EveryWhere  ID     This is your Care EveryWhere ID. This could be used by other organizations to access your [place] medical records  [id number]         Blood Pressure from Last 3 Encounters:   04/24/17 141/88   04/17/17 132/81   03/21/17 134/84    Weight from Last 3 Encounters:   05/01/17 104.3 kg (230 lb)   04/24/17 104.6 kg (230 lb 9.6 oz)   04/17/17 103 kg (227 lb)              We Performed the Following     Neuromuscular Re-Education     PT Chhaya, Low Complexity ([id number])        Primary Care Provider Office Phone # Fax #    [facility] [phone] [address] [facility] [address]        Thank you!     Thank you for choosing [place] [facility] [place]  for your care. Our goal is always to provide you with excellent care. Hearing back from our patients is one way we can continue to improve our services. Please take a few minutes to complete the written survey that you may receive in the mail after your visit with us. Thank you!             Your Updated Medication List - Protect others around you: Learn how to safely use, store and throw away your medicines at www.disposemymeds.org.          This list is accurate as of: 5/24/17 11:59 PM.  Always use your most recent med list.                   Brand Name Dispense Instructions for use    fexofenadine-pseudoePHEDrine 180-240 MG per 24 hr tablet    ALLEGRA-D 24    30 tablet    Take 1 tablet by mouth daily       fluticasone 27.5 MCG/SPRAY spray    VERAMYST     Spray 2 sprays into both nostrils daily

## 2017-05-27 PROBLEM — M23.52 RECURRENT LEFT KNEE INSTABILITY: Status: ACTIVE | Noted: 2017-05-27

## 2017-05-30 ENCOUNTER — OFFICE VISIT (OUTPATIENT)
Dept: ORTHOPEDICS | Facility: CLINIC | Age: 22
End: 2017-05-30

## 2017-05-30 ENCOUNTER — THERAPY VISIT (OUTPATIENT)
Dept: PHYSICAL THERAPY | Facility: CLINIC | Age: 22
End: 2017-05-30
Payer: COMMERCIAL

## 2017-05-30 VITALS — BODY MASS INDEX: 31.15 KG/M2 | WEIGHT: 230 LBS | RESPIRATION RATE: 16 BRPM | HEIGHT: 72 IN

## 2017-05-30 DIAGNOSIS — M23.52 RECURRENT LEFT KNEE INSTABILITY: ICD-10-CM

## 2017-05-30 DIAGNOSIS — M22.42 CHONDROMALACIA OF PATELLA, LEFT: Primary | ICD-10-CM

## 2017-05-30 PROCEDURE — 97112 NEUROMUSCULAR REEDUCATION: CPT | Mod: GP | Performed by: PHYSICAL THERAPIST

## 2017-05-30 PROCEDURE — 97110 THERAPEUTIC EXERCISES: CPT | Mod: GP | Performed by: PHYSICAL THERAPIST

## 2017-05-30 NOTE — LETTER
5/30/2017      RE: Navin Thomas  [address] [facility] [address]       Initial Visit     Referring MD: Judson Delgado     CC: Left anterior knee pain     HPI: Navin Thomas is a 22 year old year old male who presents with left anterior knee pain. He has a long history with this knee. He dislocated it during martial arts 6 years ago when he pivoted while sparring. The patella self reduced, but he did have pain. This eventually resolve and he was able to return to activity. It has never felt normal to him, however, since that time. He denies further episodes of instability, but has had worsening pain over time and does report the knee will give out on him at times. For the past 3 years, he has avoided sports due to the pain, and concern that it will give out. Currently, it is the pain that is most bothersome. He notes pain with up and down stairs, and at times with walking, as well as putting weight solely on left leg. Notes clicks with stairs,no locking/catching. He was previously seen by  and got an MRI on 4/27/17. 1 PT session with Indira Bro; he has been doing exercises given by her and he has PT session today. Despite having had PT in the past, with limited benefit, he is optimistic this time around. They have discussed types of exercises, as well as patellar taping and other modalities to address his symptoms. He is here looking for additional input on options via surgical or non-surgical treatment.     No problems in other knee.      PMH:   Patient Active Problem List   Diagnosis     Recurrent left knee instability        PSH: No past surgical history on file.     Medications:   Current Outpatient Prescriptions   Medication     fluticasone (VERAMYST) 27.5 MCG/SPRAY spray     fexofenadine-pseudoePHEDrine (ALLEGRA-D 24) 180-240 MG per 24 hr tablet     No current facility-administered medications for this visit.         Allergies:   Allergies   Allergen Reactions     Aloe  Itching        SH:   Social History     Occupational History     Not on file.     Social History Main Topics     Smoking status: Never Smoker     Smokeless tobacco: Never Used     Alcohol use No     Drug use: No     Sexual activity: Yes     Partners: Female        ROS: General ROS: negative  Respiratory ROS: no cough, shortness of breath, or wheezing  Cardiovascular ROS: no chest pain or dyspnea on exertion     PE:   Gen: A&OX3    Knee       RIGHT   LEFT   Skin:    Intact   Intact   ROM:     -  -   Effusion:    Neg   Neg   Medial joint line tenderness: Neg   Neg   Lateral joint line tenderness: Neg   Neg   Román:    Neg   Neg   Patella crepitus:   POS  POS  Patella tenderness:   Neg   Neg   Lachman:    Neg   Neg   Pivot shift:    Neg   Neg   Valgus stress:   Neg   Neg   Varus stress:    Neg   Neg   Posterior drawer:   Neg   Neg   N-V     intact   intact   Hip:     nml   nml   Lower extremity edema:  Neg   Neg    Lateral Translation:  2Q  2Q  Lateral endpoint:   Present Present  Pat. Apprehension with ROM: Neg  Neg   Lateral retinaculum :   Normal  Normal  J sign:    Neg   Neg   Hyperlaxity:    POS  POS, but Beighton's 4/9         XR:  Bones are normally aligned. Trace left knee effusion. No  acute fracture. Well-corticated ossicles are noted at the medial  aspect of the patella, perhaps incidental, or perhaps related to old  injury. No evidence of acute fracture. There may be minimal associated  edema, clinically correlate with pain localized to this region.    MRI:  1. Findings in the left knee concerning for prior lateral patellar  dislocation, possibly subacute on chronic, correlate clinically. There  is associated bony contusions along the lateral greater than medial  patellar facet with high-grade cartilage loss along the lateral  patellar facet with subchondral edema. An osteochondral impaction  injury with bone marrow edema along the lateral femoral condyle. There  is a well-corticated bone  fragment along the medial aspect of the  patella, suggestive of prior injury to the medial patellar  retinaculum. 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  2. Small left knee joint effusion.  3. The anterior and posterior cruciate ligaments, medial and lateral  supporting structures, and bilateral menisci are intact.  Sulcus angle 167  TTTG 14  IS 1.2  CD1  Functional engagement appropriate      Impression:   22 year old male with left anterior knee pain and h/o patellar instability.     Plan:   The risks and benefits of the available surgical and non-surgical treatment options were discussed with the patient and parents.  We had a long discussion about risk factors for patellar instability, as well as his exam an radiographic findings.  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  It is like that he can benefit from focused patellofemoral PT with Indira, to include patellar taping.  We also discussed surgical options, but he understands that this is something reserved following failed conservative management, and that potential cartilage regeneration procedures are not completely predictable in terms of outcome.  F/U 2 months.        cc:   Referring provider   [unfilled]     Primary care provider   [id number] [facility] [address]     Jake Restrepo MD

## 2017-05-30 NOTE — PROGRESS NOTES
Initial Visit     Referring MD: Judson Delgado     CC: Left anterior knee pain     HPI: Navin Thomas is a 22 year old year old male who presents with left anterior knee pain. He has a long history with this knee. He dislocated it during martial arts 6 years ago when he pivoted while sparring. The patella self reduced, but he did have pain. This eventually resolve and he was able to return to activity. It has never felt normal to him, however, since that time. He denies further episodes of instability, but has had worsening pain over time and does report the knee will give out on him at times. For the past 3 years, he has avoided sports due to the pain, and concern that it will give out. Currently, it is the pain that is most bothersome. He notes pain with up and down stairs, and at times with walking, as well as putting weight solely on left leg. Notes clicks with stairs,no locking/catching. He was previously seen by  and got an MRI on 4/27/17. 1 PT session with Indira Bro; he has been doing exercises given by her and he has PT session today. Despite having had PT in the past, with limited benefit, he is optimistic this time around. They have discussed types of exercises, as well as patellar taping and other modalities to address his symptoms. He is here looking for additional input on options via surgical or non-surgical treatment.     No problems in other knee.      PMH:   Patient Active Problem List   Diagnosis     Recurrent left knee instability        PSH: No past surgical history on file.     Medications:   Current Outpatient Prescriptions   Medication     fluticasone (VERAMYST) 27.5 MCG/SPRAY spray     fexofenadine-pseudoePHEDrine (ALLEGRA-D 24) 180-240 MG per 24 hr tablet     No current facility-administered medications for this visit.         Allergies:   Allergies   Allergen Reactions     Aloe Itching        SH:   Social History     Occupational History     Not on file.     Social  History Main Topics     Smoking status: Never Smoker     Smokeless tobacco: Never Used     Alcohol use No     Drug use: No     Sexual activity: Yes     Partners: Female        ROS: General ROS: negative  Respiratory ROS: no cough, shortness of breath, or wheezing  Cardiovascular ROS: no chest pain or dyspnea on exertion     PE:   Gen: A&OX3    Knee       RIGHT   LEFT   Skin:    Intact   Intact   ROM:     -  -   Effusion:    Neg   Neg   Medial joint line tenderness: Neg   Neg   Lateral joint line tenderness: Neg   Neg   Román:    Neg   Neg   Patella crepitus:   POS  POS  Patella tenderness:   Neg   Neg   Lachman:    Neg   Neg   Pivot shift:    Neg   Neg   Valgus stress:   Neg   Neg   Varus stress:    Neg   Neg   Posterior drawer:   Neg   Neg   N-V     intact   intact   Hip:     nml   nml   Lower extremity edema:  Neg   Neg    Lateral Translation:  2Q  2Q  Lateral endpoint:   Present Present  Pat. Apprehension with ROM: Neg  Neg   Lateral retinaculum :   Normal  Normal  J sign:    Neg   Neg   Hyperlaxity:    POS  POS, but Beighton's 4/9         XR:  Bones are normally aligned. Trace left knee effusion. No  acute fracture. Well-corticated ossicles are noted at the medial  aspect of the patella, perhaps incidental, or perhaps related to old  injury. No evidence of acute fracture. There may be minimal associated  edema, clinically correlate with pain localized to this region.    MRI:  1. Findings in the left knee concerning for prior lateral patellar  dislocation, possibly subacute on chronic, correlate clinically. There  is associated bony contusions along the lateral greater than medial  patellar facet with high-grade cartilage loss along the lateral  patellar facet with subchondral edema. An osteochondral impaction  injury with bone marrow edema along the lateral femoral condyle. There  is a well-corticated bone fragment along the medial aspect of the  patella, suggestive of prior injury to the medial  patellar  retinaculum. 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  2. Small left knee joint effusion.  3. The anterior and posterior cruciate ligaments, medial and lateral  supporting structures, and bilateral menisci are intact.  Sulcus angle 167  TTTG 14  IS 1.2  CD1  Functional engagement appropriate      Impression:   22 year old male with left anterior knee pain and h/o patellar instability.     Plan:   The risks and benefits of the available surgical and non-surgical treatment options were discussed with the patient and parents.  We had a long discussion about risk factors for patellar instability, as well as his exam an radiographic findings.  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  It is like that he can benefit from focused patellofemoral PT with Indira, to include patellar taping.  We also discussed surgical options, but he understands that this is something reserved following failed conservative management, and that potential cartilage regeneration procedures are not completely predictable in terms of outcome.  F/U 2 months.        cc:   Referring provider   [unfilled]     Primary care provider   [address]  [facility] [address]

## 2017-05-30 NOTE — MR AVS SNAPSHOT
After Visit Summary   5/30/2017    Navin Thomas    MRN: [id number]           Patient Information     Date Of Birth          1995        Visit Information        Provider Department      5/30/2017 9:00 AM Jake Restrepo MD [facility]         Follow-ups after your visit        Your next 10 appointments already scheduled     May 30, 2017  2:40 PM CDT   NIHARIKA Extremity with Kim Villela PT   [facility])    [address] [place] [address]   [phone]            Jun 06, 2017  9:30 AM CDT   NIHARIKA Extremity with Indira Bro PT   [facility]    [facility] [facility] [address]   [phone]            Jun 13, 2017  9:00 AM CDT   NIHARIKA Extremity with Yadira Monge PT   [facility]    [address] [place] [address]   [phone]            Aug 29, 2017  9:30 AM CDT   (Arrive by 9:15 AM)   Return Visit with Jake Restrepo MD   [facility] ([facility] [place])    [address]  [address] [facility] [address] [phone]              Who to contact     Please call your clinic at [phone] to:    Ask questions about your health    Make or cancel appointments    Discuss your medicines    Learn about your test results    Speak to your doctor   If you have compliments or concerns about an experience at your clinic, or if you wish to file a complaint, please contact [facility] Patient Relations at [phone] or email us at [email or web address]         Additional Information About Your Visit        Care EveryWhere ID     This is your Care EveryWhere ID. This could be used by other organizations to access your [place] medical records  [id number]       "  Your Vitals Were     Respirations Height BMI (Body Mass Index)             16 5' 11.5\" (1.816 m) 31.63 kg/m2          Blood Pressure from Last 3 Encounters:   04/24/17 141/88   04/17/17 132/81   03/21/17 134/84    Weight from Last 3 Encounters:   05/30/17 230 lb (104.3 kg)   05/01/17 230 lb (104.3 kg)   04/24/17 230 lb 9.6 oz (104.6 kg)              Today, you had the following     No orders found for display       Primary Care Provider Office Phone # Fax #    [facility] [phone] [address] [facility] [address]        Thank you!     Thank you for choosing [facility]  for your care. Our goal is always to provide you with excellent care. Hearing back from our patients is one way we can continue to improve our services. Please take a few minutes to complete the written survey that you may receive in the mail after your visit with us. Thank you!             Your Updated Medication List - Protect others around you: Learn how to safely use, store and throw away your medicines at www.disposemymeds.org.          This list is accurate as of: 5/30/17  9:38 AM.  Always use your most recent med list.                   Brand Name Dispense Instructions for use    fexofenadine-pseudoePHEDrine 180-240 MG per 24 hr tablet    ALLEGRA-D 24    30 tablet    Take 1 tablet by mouth daily       fluticasone 27.5 MCG/SPRAY spray    VERAMYST     Spray 2 sprays into both nostrils daily         "

## 2017-05-30 NOTE — PROGRESS NOTES
Subjective:    Patient is a 22 year old male presenting with rehab left ankle/foot hpi.                    and reported as 1/10.                General health as reported by patient is good.          Current occupation is Student.    Primary job tasks include:  Prolonged sitting (Computer work).                                Objective:    System    Physical Exam    General     ROS    Assessment/Plan:

## 2017-06-06 ENCOUNTER — THERAPY VISIT (OUTPATIENT)
Dept: PHYSICAL THERAPY | Facility: CLINIC | Age: 22
End: 2017-06-06
Payer: COMMERCIAL

## 2017-06-06 DIAGNOSIS — M23.52 RECURRENT LEFT KNEE INSTABILITY: ICD-10-CM

## 2017-06-06 PROCEDURE — 97112 NEUROMUSCULAR REEDUCATION: CPT | Mod: GP | Performed by: PHYSICAL THERAPIST

## 2017-06-06 PROCEDURE — 97110 THERAPEUTIC EXERCISES: CPT | Mod: GP | Performed by: PHYSICAL THERAPIST

## 2017-06-06 ASSESSMENT — ACTIVITIES OF DAILY LIVING (ADL)
STIFFNESS: I DO NOT HAVE THE SYMPTOM
SQUAT: ACTIVITY IS MINIMALLY DIFFICULT
GO UP STAIRS: ACTIVITY IS SOMEWHAT DIFFICULT
STAND: ACTIVITY IS MINIMALLY DIFFICULT
SIT WITH YOUR KNEE BENT: ACTIVITY IS NOT DIFFICULT
AS_A_RESULT_OF_YOUR_KNEE_INJURY,_HOW_WOULD_YOU_RATE_YOUR_CURRENT_LEVEL_OF_DAILY_ACTIVITY?: NEARLY NORMAL
SWELLING: I DO NOT HAVE THE SYMPTOM
WALK: ACTIVITY IS MINIMALLY DIFFICULT
GO DOWN STAIRS: ACTIVITY IS SOMEWHAT DIFFICULT
KNEEL ON THE FRONT OF YOUR KNEE: ACTIVITY IS SOMEWHAT DIFFICULT
HOW_WOULD_YOU_RATE_THE_OVERALL_FUNCTION_OF_YOUR_KNEE_DURING_YOUR_USUAL_DAILY_ACTIVITIES?: NEARLY NORMAL
RISE FROM A CHAIR: ACTIVITY IS NOT DIFFICULT
GIVING WAY, BUCKLING OR SHIFTING OF KNEE: I DO NOT HAVE THE SYMPTOM
WEAKNESS: I DO NOT HAVE THE SYMPTOM
PAIN: I DO NOT HAVE THE SYMPTOM

## 2017-06-06 NOTE — MR AVS SNAPSHOT
After Visit Summary   6/6/2017    Navin Thomas    MRN: [id number]           Patient Information     Date Of Birth          1995        Visit Information        Provider Department      6/6/2017 9:30 AM Indira Bro PT [facility] [place]        Today's Diagnoses     Recurrent left knee instability           Follow-ups after your visit        Your next 10 appointments already scheduled     Jun 13, 2017  9:00 AM CDT   NIHARIKA Extremity with Yadira Monge PT   [facility] [place] [facility]    [address] [place] [id number] [phone]            Aug 29, 2017  9:30 AM CDT   (Arrive by 9:15 AM)   Return Visit with Jake Restrepo MD   [facility] ([facility] [place])    [address] [facility] [address] [facility]  [address] [facility] [address] [phone]              Who to contact     If you have questions or need follow up information about today's clinic visit or your schedule please contact [facility] directly at [phone].  Normal or non-critical lab and imaging results will be communicated to you by MyChart, letter or phone within 4 business days after the clinic has received the results. If you do not hear from us within 7 days, please contact the clinic through MyChart or phone. If you have a critical or abnormal lab result, we will notify you by phone as soon as possible.  Submit refill requests through FTBprot or call your pharmacy and they will forward the refill request to us. Please allow 3 business days for your refill to be completed.          Additional Information About Your Visit        Care EveryWhere ID     This is your Care EveryWhere ID. This could be used by other organizations to access your [place] medical records  [id number]         Blood Pressure from Last 3 Encounters:   04/24/17 141/88   04/17/17 132/81   03/21/17  134/84    Weight from Last 3 Encounters:   05/30/17 104.3 kg (230 lb)   05/01/17 104.3 kg (230 lb)   04/24/17 104.6 kg (230 lb 9.6 oz)              Today, you had the following     No orders found for display       Primary Care Provider Office Phone # Fax #    [facility] [phone] [address] [facility] [address]        Thank you!     Thank you for choosing [place] [facility] [place]  for your care. Our goal is always to provide you with excellent care. Hearing back from our patients is one way we can continue to improve our services. Please take a few minutes to complete the written survey that you may receive in the mail after your visit with us. Thank you!             Your Updated Medication List - Protect others around you: Learn how to safely use, store and throw away your medicines at www.disposemymeds.org.          This list is accurate as of: 6/6/17  9:40 AM.  Always use your most recent med list.                   Brand Name Dispense Instructions for use    fexofenadine-pseudoePHEDrine 180-240 MG per 24 hr tablet    ALLEGRA-D 24    30 tablet    Take 1 tablet by mouth daily       fluticasone 27.5 MCG/SPRAY spray    VERAMYST     Spray 2 sprays into both nostrils daily

## 2017-06-13 ENCOUNTER — THERAPY VISIT (OUTPATIENT)
Dept: PHYSICAL THERAPY | Facility: CLINIC | Age: 22
End: 2017-06-13
Payer: COMMERCIAL

## 2017-06-13 DIAGNOSIS — M23.52 RECURRENT LEFT KNEE INSTABILITY: ICD-10-CM

## 2017-06-13 PROCEDURE — 97110 THERAPEUTIC EXERCISES: CPT | Mod: GP | Performed by: PHYSICAL THERAPIST

## 2017-06-13 PROCEDURE — 97112 NEUROMUSCULAR REEDUCATION: CPT | Mod: GP | Performed by: PHYSICAL THERAPIST

## 2017-06-27 ENCOUNTER — THERAPY VISIT (OUTPATIENT)
Dept: PHYSICAL THERAPY | Facility: CLINIC | Age: 22
End: 2017-06-27
Payer: COMMERCIAL

## 2017-06-27 DIAGNOSIS — M23.52 RECURRENT LEFT KNEE INSTABILITY: ICD-10-CM

## 2017-06-27 PROCEDURE — 97110 THERAPEUTIC EXERCISES: CPT | Mod: GP | Performed by: PHYSICAL THERAPIST

## 2017-06-27 PROCEDURE — 97112 NEUROMUSCULAR REEDUCATION: CPT | Mod: GP | Performed by: PHYSICAL THERAPIST

## 2017-07-11 ENCOUNTER — THERAPY VISIT (OUTPATIENT)
Dept: PHYSICAL THERAPY | Facility: CLINIC | Age: 22
End: 2017-07-11
Payer: COMMERCIAL

## 2017-07-11 DIAGNOSIS — M23.52 RECURRENT LEFT KNEE INSTABILITY: ICD-10-CM

## 2017-07-11 PROCEDURE — 97110 THERAPEUTIC EXERCISES: CPT | Mod: GP | Performed by: PHYSICAL THERAPIST

## 2017-07-11 PROCEDURE — 97112 NEUROMUSCULAR REEDUCATION: CPT | Mod: GP | Performed by: PHYSICAL THERAPIST

## 2017-08-01 ENCOUNTER — THERAPY VISIT (OUTPATIENT)
Dept: PHYSICAL THERAPY | Facility: CLINIC | Age: 22
End: 2017-08-01
Payer: COMMERCIAL

## 2017-08-01 DIAGNOSIS — M23.52 RECURRENT LEFT KNEE INSTABILITY: ICD-10-CM

## 2017-08-01 PROCEDURE — 97112 NEUROMUSCULAR REEDUCATION: CPT | Mod: GP | Performed by: PHYSICAL THERAPIST

## 2017-08-01 PROCEDURE — 97110 THERAPEUTIC EXERCISES: CPT | Mod: GP | Performed by: PHYSICAL THERAPIST

## 2017-08-01 NOTE — MR AVS SNAPSHOT
"              After Visit Summary   8/1/2017    Navin Thomas    MRN: [id number]           Patient Information     Date Of Birth          1995        Visit Information        Provider Department      8/1/2017 6:30 PM Indira Bro, PT [facility] [place]        Today's Diagnoses     Recurrent left knee instability           Follow-ups after your visit        Your next 10 appointments already scheduled     Aug 29, 2017  9:30 AM CDT   (Arrive by 9:15 AM)   Return Visit with Jake Restrepo MD   [facility] ([facility] [place])    [address]  [address] [facility] [address]   [phone]            Sep 06, 2017  5:50 PM CDT   NIHARIKA Extremity with Indira Bro PT   [facility] [place] ([address] [id number] [phone]              Who to contact     If you have questions or need follow up information about today's clinic visit or your schedule please contact [facility] [employer] [place] directly at [phone].  Normal or non-critical lab and imaging results will be communicated to you by Sleep Numberhart, letter or phone within 4 business days after the clinic has received the results. If you do not hear from us within 7 days, please contact the clinic through milliPay Systemst or phone. If you have a critical or abnormal lab result, we will notify you by phone as soon as possible.  Submit refill requests through Linqia or call your pharmacy and they will forward the refill request to us. Please allow 3 business days for your refill to be completed.          Additional Information About Your Visit        Sleep NumberharBEST [employer] Technology Information     Linqia lets you send messages to your doctor, view your test results, renew your prescriptions, schedule appointments and more. To sign up, go to [email or web address] . Click on \"Log in\" on the left side of the " "screen, which will take you to the Welcome page. Then click on \"Sign up Now\" on the right side of the page.     You will be asked to enter the access code listed below, as well as some personal information. Please follow the directions to create your username and password.     Your access code is: [email or web address]  Expires: 2017  9:33 AM     Your access code will  in 90 days. If you need help or a new code, please call your [facility] or [phone].        Care EveryWhere ID     This is your Care EveryWhere ID. This could be used by other organizations to access your [place] medical records  [id number]         Blood Pressure from Last 3 Encounters:   17 141/88   17 132/81   17 134/84    Weight from Last 3 Encounters:   17 104.3 kg (230 lb)   17 104.3 kg (230 lb)   17 104.6 kg (230 lb 9.6 oz)              We Performed the Following     Neuromuscular Re-Education     Therapeutic Exercises        Primary Care Provider Office Phone # Fax #    [facility] [phone] [address] [facility] [address]        Equal Access to Services     ELLI BROWN : Hadii aad ku hadasho Soomaali, waaxda luqadaha, qaybta kaalmada adeegyada, waxay sarah bethin german recinos. So [facility] [phone].    ATENCIÓN: Si habla español, tiene a akhtar disposición servicios gratuitos de asistencia lingüística. Llame al [phone].    We comply with applicable federal civil rights laws and [place] laws. We do not discriminate on the basis of race, color, national origin, age, disability sex, sexual orientation or gender identity.            Thank you!     Thank you for choosing [facility] [place]  for your care. Our goal is always to provide you with excellent care. Hearing back from our patients is one way we can continue to improve our services. Please take a few minutes to complete the written survey that you may receive in " the mail after your visit with us. Thank you!             Your Updated Medication List - Protect others around you: Learn how to safely use, store and throw away your medicines at www.disposemymeds.org.          This list is accurate as of: 8/1/17 11:59 PM.  Always use your most recent med list.                   Brand Name Dispense Instructions for use Diagnosis    fexofenadine-pseudoePHEDrine 180-240 MG per 24 hr tablet    ALLEGRA-D 24    30 tablet    Take 1 tablet by mouth daily    Seasonal allergic rhinitis due to pollen       fluticasone 27.5 MCG/SPRAY spray    VERAMYST     Spray 2 sprays into both nostrils daily

## 2017-09-11 ENCOUNTER — THERAPY VISIT (OUTPATIENT)
Dept: PHYSICAL THERAPY | Facility: CLINIC | Age: 22
End: 2017-09-11
Payer: COMMERCIAL

## 2017-09-11 DIAGNOSIS — M23.52 RECURRENT LEFT KNEE INSTABILITY: ICD-10-CM

## 2017-09-11 PROCEDURE — 97112 NEUROMUSCULAR REEDUCATION: CPT | Mod: GP | Performed by: PHYSICAL THERAPIST

## 2017-09-11 PROCEDURE — 97110 THERAPEUTIC EXERCISES: CPT | Mod: GP | Performed by: PHYSICAL THERAPIST

## 2017-09-11 ASSESSMENT — ACTIVITIES OF DAILY LIVING (ADL)
GIVING WAY, BUCKLING OR SHIFTING OF KNEE: I DO NOT HAVE THE SYMPTOM
WALK: ACTIVITY IS NOT DIFFICULT
HOW_WOULD_YOU_RATE_THE_CURRENT_FUNCTION_OF_YOUR_KNEE_DURING_YOUR_USUAL_DAILY_ACTIVITIES_ON_A_SCALE_FROM_0_TO_100_WITH_100_BEING_YOUR_LEVEL_OF_KNEE_FUNCTION_PRIOR_TO_YOUR_INJURY_AND_0_BEING_THE_INABILITY_TO_PERFORM_ANY_OF_YOUR_USUAL_DAILY_ACTIVITIES?: 95
SQUAT: ACTIVITY IS NOT DIFFICULT
SWELLING: I DO NOT HAVE THE SYMPTOM
GO UP STAIRS: ACTIVITY IS NOT DIFFICULT
GO DOWN STAIRS: ACTIVITY IS NOT DIFFICULT
SIT WITH YOUR KNEE BENT: ACTIVITY IS NOT DIFFICULT
WEAKNESS: I DO NOT HAVE THE SYMPTOM
RISE FROM A CHAIR: ACTIVITY IS NOT DIFFICULT
STAND: ACTIVITY IS NOT DIFFICULT
PAIN: I DO NOT HAVE THE SYMPTOM
AS_A_RESULT_OF_YOUR_KNEE_INJURY,_HOW_WOULD_YOU_RATE_YOUR_CURRENT_LEVEL_OF_DAILY_ACTIVITY?: NORMAL
KNEEL ON THE FRONT OF YOUR KNEE: ACTIVITY IS NOT DIFFICULT
HOW_WOULD_YOU_RATE_THE_OVERALL_FUNCTION_OF_YOUR_KNEE_DURING_YOUR_USUAL_DAILY_ACTIVITIES?: NEARLY NORMAL
STIFFNESS: I DO NOT HAVE THE SYMPTOM

## 2017-09-11 NOTE — PROGRESS NOTES
Subjective:    HPI                    Objective:    System    Physical Exam    General     ROS    Assessment/Plan:      DISCHARGE REPORT    Progress reporting period is from 5- to 9-.       SUBJECTIVE  Subjective changes noted by patient:  Overall improvement has been significant and has no major complaints. Knee stability has greatly improved with ascending stairs.  He feels comfortable continuing with independent home program to self manage symptoms.    Current pain level is 0/10  .     Previous pain level was  Initial Pain level: 4/10.   Changes in function:  Yes (See Goal flowsheet attached for changes in current functional level)  Adverse reaction to treatment or activity: None    OBJECTIVE  Changes noted in objective findings:  Knee Evaluation:  ROM:    AROM     Hyperextension:  Left:  8    Right: 10  Extension:  Left: 0    Right:  0  Flexion: Left: 132    Right: 135     Strength:       Good  Isometric Quadricep contraction (L)  Mmt: gluteus medius: 4+/5 (L), 4+/5 (R), hip extensors: 4+/5 (L), 4+/5 (R)  Quadriceps: 5/5 (B), HS: 5/5 (B),  Palpation:  Normal      Functional Testing:  : squat:  No dynamic valgus noted at base of descent on  left  with shifting to right lower extremity. SIngle leg squat: good lower extremity control for first 30 degrees and then valgus noted          ASSESSMENT/PLAN  Updated problem list and treatment plan: Diagnosis 1:  Left knee instability  Pain -  hot/cold therapy, self management, education, directional preference exercise and home program  Decreased strength - therapeutic exercise, therapeutic activities and home program  Impaired muscle performance - neuro re-education and home program  Decreased function - therapeutic activities and home program       STG/LTGs have been met or progress has been made towards goals:  Yes (See Goal flow sheet completed today.)  Assessment of Progress: The patient has met all of their long term goals.  Self Management Plans:   Patient has been instructed in a home treatment program.  Patient is independent in a home treatment program.  Patient  has been instructed in self management of symptoms.  Patient is independent in self management of symptoms.  I have re-evaluated this patient and find that the nature, scope, duration and intensity of the therapy is appropriate for the medical condition of the patient.  Navin continues to require the following intervention to meet STG and LTG's:  PT intervention is no longer required to meet STG/LTG.    Recommendations:  This patient is ready to be discharged from therapy and continue their home treatment program.    Please refer to the daily flowsheet for treatment today, total treatment time and time spent performing 1:1 timed codes.

## 2017-09-11 NOTE — Clinical Note
Navin Sloan responded very well to course of treatment and left knee instability has significantly reduced. See discharge summary.  Indira

## 2017-09-11 NOTE — MR AVS SNAPSHOT
"              After Visit Summary   9/11/2017    Navin Thomas    MRN: [id number]           Patient Information     Date Of Birth          1995        Visit Information        Provider Department      9/11/2017 11:40 AM Indira Bro PT [facility] [place]        Today's Diagnoses     Recurrent left knee instability           Follow-ups after your visit        Your next 10 appointments already scheduled     Oct 03, 2017 11:00 AM CDT   (Arrive by 10:45 AM)   Return Visit with Jake Restrepo MD   [facility] [place])    [address] [place], [place] [address] [phone]              Who to contact     If you have questions or need follow up information about today's clinic visit or your schedule please contact [facility] [place] directly at [phone].  Normal or non-critical lab and imaging results will be communicated to you by MyChart, letter or phone within 4 business days after the clinic has received the results. If you do not hear from us within 7 days, please contact the clinic through NVISION MEDICALhart or phone. If you have a critical or abnormal lab result, we will notify you by phone as soon as possible.  Submit refill requests through [facility] or call your pharmacy and they will forward the refill request to us. Please allow 3 business days for your refill to be completed.          Additional Information About Your Visit        MyChart Information     [facility] lets you send messages to your doctor, view your test results, renew your prescriptions, schedule appointments and more. To sign up, go to [email or web address] . Click on \"Log in\" on the left side of the screen, which will take you to the Welcome page. Then click on \"Sign up Now\" on the right side of the page.     You will be asked to enter the access code listed below, as well as some personal information. Please follow the " directions to create your username and password.     Your access code is: [email or web address]  Expires: 2017  9:33 AM     Your access code will  in 90 days. If you need help or a new code, please call your [facility] or [phone].        Care EveryWhere ID     This is your Care EveryWhere ID. This could be used by other organizations to access your [place] medical records  [id number]         Blood Pressure from Last 3 Encounters:   17 141/88   17 132/81   17 134/84    Weight from Last 3 Encounters:   17 104.3 kg (230 lb)   17 104.3 kg (230 lb)   17 104.6 kg (230 lb 9.6 oz)              We Performed the Following     Neuromuscular Re-Education     Therapeutic Exercises        Primary Care Provider Office Phone # Fax #    [facility] [phone] [address] [facility] [address]        Equal Access to Services     ELLI BROWN : Hadii aad ku hadasho Soomaali, waaxda luqadaha, qaybta kaalmada adeegyada, dayanara moser haymile azevedo . So [facility] [phone].    ATENCIÓN: Si habla español, tiene a akhtar disposición servicios gratuitos de asistencia lingüística. Llame al [phone].    We comply with applicable federal civil rights laws and [place] laws. We do not discriminate on the basis of race, color, national origin, age, disability sex, sexual orientation or gender identity.            Thank you!     Thank you for choosing [facility] [place]  for your care. Our goal is always to provide you with excellent care. Hearing back from our patients is one way we can continue to improve our services. Please take a few minutes to complete the written survey that you may receive in the mail after your visit with us. Thank you!             Your Updated Medication List - Protect others around you: Learn how to safely use, store and throw away your medicines at www.disposemymeds.org.          This list is  accurate as of: 9/11/17 11:59 PM.  Always use your most recent med list.                   Brand Name Dispense Instructions for use Diagnosis    fexofenadine-pseudoePHEDrine 180-240 MG per 24 hr tablet    ALLEGRA-D 24    30 tablet    Take 1 tablet by mouth daily    Seasonal allergic rhinitis due to pollen       fluticasone 27.5 MCG/SPRAY spray    VERAMYST     Spray 2 sprays into both nostrils daily

## 2017-09-11 NOTE — PROGRESS NOTES
Subjective:    HPI                    Objective:    System    Physical Exam    General     ROS    Assessment/Plan:      DISCHARGE REPORT    Progress reporting period is from 5- to 9-.       SUBJECTIVE  Subjective changes noted by patient: Navin has not had time to consistently run due to job and has completed week one of walk/jog program. Overall improvement has been significant. And has no major complaints. He feels comfortable   Current pain level is 0/10  .     Initial Pain level: 4/10.   Changes in function:  Yes (See Goal flowsheet attached for changes in current functional level)  Adverse reaction to treatment or activity: None    OBJECTIVE  Changes noted in objective findings:   Knee Evaluation:  ROM:    AROM     Hyperextension:  Left:  8    Right: 10  Extension:  Left: 0    Right:  0  Flexion: Left: 135    Right: 135  MMT: Quadriceps: 5-/5- 5/5 (L), 5/5 (R), Hamstrings: 5/5 (B), gluteus   medius: 5-/5 (L) 5/5 (R)    Functional Testing:  good eccentric control with one legged squat to  4 inch step down and good proprioceptive awareness with single leg balance on mini tramp.             ASSESSMENT/PLAN  Updated problem list and treatment plan: Diagnosis 1:  Left knee instability  Pain -  hot/cold therapy, self management, education, directional preference exercise and home program  Decreased strength - therapeutic exercise, therapeutic activities and home program  Impaired muscle performance - neuro re-education and home program  Decreased function - therapeutic activities and home program     STG/LTGs have been met or progress has been made towards goals:  Yes (See Goal flow sheet completed today.)  Assessment of Progress: The patient's condition is improving.  Self Management Plans:  Patient has been instructed in a home treatment program.  Patient is independent in a home treatment program.  Patient  has been instructed in self management of symptoms.  Patient is independent in self management  of symptoms.  I have re-evaluated this patient and find that the nature, scope, duration and intensity of the therapy is appropriate for the medical condition of the patient.  Navin continues to require the following intervention to meet STG and LTG's:  PT intervention is no longer required to meet STG/LTG.    Recommendations:  This patient is ready to be discharged from therapy and continue their home treatment program.    Please refer to the daily flowsheet for treatment today, total treatment time and time spent performing 1:1 timed codes.

## 2017-09-13 PROBLEM — M23.52 RECURRENT LEFT KNEE INSTABILITY: Status: RESOLVED | Noted: 2017-05-27 | Resolved: 2017-09-13

## 2017-10-03 ENCOUNTER — OFFICE VISIT (OUTPATIENT)
Dept: ORTHOPEDICS | Facility: CLINIC | Age: 22
End: 2017-10-03

## 2017-10-03 VITALS — BODY MASS INDEX: 30.75 KG/M2 | RESPIRATION RATE: 16 BRPM | WEIGHT: 227 LBS | HEIGHT: 72 IN

## 2017-10-03 DIAGNOSIS — M22.42 CHONDROMALACIA OF PATELLA, LEFT: Primary | ICD-10-CM

## 2017-10-03 NOTE — LETTER
Date:October 4, 2017      Patient was self referred, no letter generated. Do not send.        [facility] Health Information

## 2017-10-03 NOTE — LETTER
10/3/2017      RE: Navin Thomas  [address] [address] [address]       SUBJECTIVE:  Navin Thomas is a 22 year old year old male who returns for f/u of left anterior knee pain. He has been working with Indira Bro and doing HEP, and feels he is doing very well. He has no swelling or pain. He has gradually been able to work back to running; only has occasional pain after running for a little bit, but this is getting better. He has not yet tried martial arts, but this is due to his schedule, not because he does not think he can do it.    PE:   Gen: A&OX3    Knee       RIGHT   LEFT   Skin:    Intact   Intact   ROM:     -  -   Effusion:    Neg   Neg   Medial joint line tenderness: Neg   Neg   Lateral joint line tenderness: Neg   Neg   Román:    Neg   Neg   Patella crepitus:   POS  POS  Patella tenderness:   Neg   Neg   Lachman:    Neg   Neg   Pivot shift:    Neg   Neg   Valgus stress:   Neg   Neg   Varus stress:    Neg   Neg   Posterior drawer:   Neg   Neg   N-V     intact   intact   Hip:     nml   nml   Lower extremity edema:  Neg   Neg    Lateral Translation:  2Q  2Q  Lateral endpoint:   Present Present  Pat. Apprehension with ROM: Neg  Neg   Lateral retinaculum :   Normal  Normal  J sign:    Neg   Neg   Hyperlaxity:    POS  POS, but Beighton's 4/9         XR:  Bones are normally aligned. Trace left knee effusion. No  acute fracture. Well-corticated ossicles are noted at the medial  aspect of the patella, perhaps incidental, or perhaps related to old  injury. No evidence of acute fracture. There may be minimal associated  edema, clinically correlate with pain localized to this region.    MRI:  1. Findings in the left knee concerning for prior lateral patellar  dislocation, possibly subacute on chronic, correlate clinically. There  is associated bony contusions along the lateral greater than medial  patellar facet with high-grade cartilage loss along the lateral  patellar facet with  subchondral edema. An osteochondral impaction  injury with bone marrow edema along the lateral femoral condyle. There  is a well-corticated bone fragment along the medial aspect of the  patella, suggestive of prior injury to the medial patellar  retinaculum. 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  2. Small left knee joint effusion.  3. The anterior and posterior cruciate ligaments, medial and lateral  supporting structures, and bilateral menisci are intact.  Sulcus angle 167  TTTG 14  IS 1.2  CD1  Functional engagement appropriate      Impression:   22 year old male with left anterior knee pain and h/o patellar instability.     Plan:   The risks and benefits of the available surgical and non-surgical treatment options were discussed again with the patient and father.  He feels he is doing well and has returned to the activities he would like to be doing at this point.  We again had a discussion about his knee anatomy and the current state of his cartilage health.  Since he is doing well, however, we would not be likely to improve things with surgery at this time.  He will monitor for a return of pain or swelling. If it returns, would consider further PT. If surgery indicated, this may involve tibial tubercle osteotomy +/- cartilage regeneration procedure.  Counseled about the importance of maintenance of HEP.  F/U prn.      Jake Restrepo MD

## 2017-10-03 NOTE — MR AVS SNAPSHOT
After Visit Summary   10/3/2017    Navin Thomas    MRN: [id number]           Patient Information     Date Of Birth          1995        Visit Information        Provider Department      10/3/2017 11:00 AM Jake Restrepo MD [facility]        Today's Diagnoses     Chondromalacia of patella, left    -  1       Follow-ups after your visit        Your next 10 appointments already scheduled     Oct 03, 2017 11:00 AM CDT   (Arrive by 10:45 AM)   Return Visit with MD MARQUEZ Petty [facility] ([facility] [place])    [address] [place] [address] [phone]              Who to contact     Please call your clinic at [phone] to:    Ask questions about your health    Make or cancel appointments    Discuss your medicines    Learn about your test results    Speak to your doctor   If you have compliments or concerns about an experience at your clinic, or if you wish to file a complaint, please contact [facility] Patient Relations at [phone] or email us at [email or web address]         Additional Information About Your Visit        MyChart Information     BringMeThatt is an electronic gateway that provides easy, online access to your medical records. With BriefCam, you can request a clinic appointment, read your test results, renew a prescription or communicate with your care team.     To sign up for BringMeThatt visit the website at [email or web address]   You will be asked to enter the access code listed below, as well as some personal information. Please follow the directions to create your username and password.     Your access code is: [email or web address]  Expires: 2018 10:58 AM     Your access code will  in 90 days. If you need help or a new code, please contact your [facility] or call [phone] for assistance.        Care  "EveryWhere ID     This is your Care EveryWhere ID. This could be used by other organizations to access your [place] medical records  [id number]        Your Vitals Were     Respirations Height BMI (Body Mass Index)             16 5' 11.5\" (1.816 m) 31.22 kg/m2          Blood Pressure from Last 3 Encounters:   04/24/17 141/88   04/17/17 132/81   03/21/17 134/84    Weight from Last 3 Encounters:   10/03/17 227 lb (103 kg)   05/30/17 230 lb (104.3 kg)   05/01/17 230 lb (104.3 kg)              Today, you had the following     No orders found for display       Primary Care Provider Office Phone # Fax #    [facility] [phone] [address] [address] [address]        Equal Access to Services     ELLI BROWN : Hadii aad ku hadasho Sokvng, waaxda luqadaha, qaybta kaalmada aderichieyada, dayanara azevedo . So [facility] [phone].    ATENCIÓN: Si habla español, tiene a akhtar disposición servicios gratuitos de asistencia lingüística. Suri al [phone].    We comply with applicable federal civil rights laws and [place] laws. We do not discriminate on the basis of race, color, national origin, age, disability, sex, sexual orientation, or gender identity.            Thank you!     Thank you for choosing [facility]  for your care. Our goal is always to provide you with excellent care. Hearing back from our patients is one way we can continue to improve our services. Please take a few minutes to complete the written survey that you may receive in the mail after your visit with us. Thank you!             Your Updated Medication List - Protect others around you: Learn how to safely use, store and throw away your medicines at www.disposemymeds.org.          This list is accurate as of: 10/3/17 10:58 AM.  Always use your most recent med list.                   Brand Name Dispense Instructions for use Diagnosis    fexofenadine-pseudoePHEDrine 180-240 MG per " 24 hr tablet    ALLEGRA-D 24    30 tablet    Take 1 tablet by mouth daily    Seasonal allergic rhinitis due to pollen       fluticasone 27.5 MCG/SPRAY spray    VERAMYST     Spray 2 sprays into both nostrils daily

## 2017-10-03 NOTE — PROGRESS NOTES
SUBJECTIVE:  Navin Thomas is a 22 year old year old male who returns for f/u of left anterior knee pain. He has been working with Indira Bro and doing HEP, and feels he is doing very well. He has no swelling or pain. He has gradually been able to work back to running; only has occasional pain after running for a little bit, but this is getting better. He has not yet tried martial arts, but this is due to his schedule, not because he does not think he can do it.    PE:   Gen: A&OX3    Knee       RIGHT   LEFT   Skin:    Intact   Intact   ROM:     -  -   Effusion:    Neg   Neg   Medial joint line tenderness: Neg   Neg   Lateral joint line tenderness: Neg   Neg   Román:    Neg   Neg   Patella crepitus:   POS  POS  Patella tenderness:   Neg   Neg   Lachman:    Neg   Neg   Pivot shift:    Neg   Neg   Valgus stress:   Neg   Neg   Varus stress:    Neg   Neg   Posterior drawer:   Neg   Neg   N-V     intact   intact   Hip:     nml   nml   Lower extremity edema:  Neg   Neg    Lateral Translation:  2Q  2Q  Lateral endpoint:   Present Present  Pat. Apprehension with ROM: Neg  Neg   Lateral retinaculum :   Normal  Normal  J sign:    Neg   Neg   Hyperlaxity:    POS  POS, but Beighton's 4/9         XR:  Bones are normally aligned. Trace left knee effusion. No  acute fracture. Well-corticated ossicles are noted at the medial  aspect of the patella, perhaps incidental, or perhaps related to old  injury. No evidence of acute fracture. There may be minimal associated  edema, clinically correlate with pain localized to this region.    MRI:  1. Findings in the left knee concerning for prior lateral patellar  dislocation, possibly subacute on chronic, correlate clinically. There  is associated bony contusions along the lateral greater than medial  patellar facet with high-grade cartilage loss along the lateral  patellar facet with subchondral edema. An osteochondral impaction  injury with bone marrow edema along  the lateral femoral condyle. There  is a well-corticated bone fragment along the medial aspect of the  patella, suggestive of prior injury to the medial patellar  retinaculum. 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  2. Small left knee joint effusion.  3. The anterior and posterior cruciate ligaments, medial and lateral  supporting structures, and bilateral menisci are intact.  Sulcus angle 167  TTTG 14  IS 1.2  CD1  Functional engagement appropriate      Impression:   22 year old male with left anterior knee pain and h/o patellar instability.     Plan:   The risks and benefits of the available surgical and non-surgical treatment options were discussed again with the patient and father.  He feels he is doing well and has returned to the activities he would like to be doing at this point.  We again had a discussion about his knee anatomy and the current state of his cartilage health.  Since he is doing well, however, we would not be likely to improve things with surgery at this time.  He will monitor for a return of pain or swelling. If it returns, would consider further PT. If surgery indicated, this may involve tibial tubercle osteotomy +/- cartilage regeneration procedure.  Counseled about the importance of maintenance of HEP.  F/U prn.

## 2017-11-10 ENCOUNTER — OFFICE VISIT (OUTPATIENT)
Dept: FAMILY MEDICINE | Facility: CLINIC | Age: 22
End: 2017-11-10
Payer: COMMERCIAL

## 2017-11-10 VITALS
WEIGHT: 229 LBS | OXYGEN SATURATION: 98 % | TEMPERATURE: 97.5 F | BODY MASS INDEX: 31.49 KG/M2 | DIASTOLIC BLOOD PRESSURE: 80 MMHG | SYSTOLIC BLOOD PRESSURE: 132 MMHG | RESPIRATION RATE: 15 BRPM | HEART RATE: 71 BPM

## 2017-11-10 DIAGNOSIS — D31.51: Primary | ICD-10-CM

## 2017-11-10 PROCEDURE — 99213 OFFICE O/P EST LOW 20 MIN: CPT | Performed by: PHYSICIAN ASSISTANT

## 2017-11-10 NOTE — MR AVS SNAPSHOT
"              After Visit Summary   11/10/2017    Navin Thomas    MRN: [id number]           Patient Information     Date Of Birth          1995        Visit Information        Provider Department      11/10/2017 1:20 PM Razia Pressley PA-C [facility]        Today's Diagnoses     Benign lesion of lacrimal duct, right    -  1       Follow-ups after your visit        Who to contact     If you have questions or need follow up information about today's clinic visit or your schedule please contact [facility] directly at [phone].  Normal or non-critical lab and imaging results will be communicated to you by Omirohart, letter or phone within 4 business days after the clinic has received the results. If you do not hear from us within 7 days, please contact the clinic through Omirohart or phone. If you have a critical or abnormal lab result, we will notify you by phone as soon as possible.  Submit refill requests through TalkyLand or call your pharmacy and they will forward the refill request to us. Please allow 3 business days for your refill to be completed.          Additional Information About Your Visit        MyChart Information     TalkyLand lets you send messages to your doctor, view your test results, renew your prescriptions, schedule appointments and more. To sign up, go to [email or web address] . Click on \"Log in\" on the left side of the screen, which will take you to the Welcome page. Then click on \"Sign up Now\" on the right side of the page.     You will be asked to enter the access code listed below, as well as some personal information. Please follow the directions to create your username and password.     Your access code is: [email or web address]  Expires: 2018  9:58 AM     Your access code will  in 90 days. If you need help or a new code, please call your [facility]] or [phone].        Care EveryWhere ID     This is your Care EveryWhere ID. This " could be used by other organizations to access your [place] medical records  [id number]        Your Vitals Were     Pulse Temperature Respirations Pulse Oximetry BMI (Body Mass Index)       71 97.5  F (36.4  C) (Oral) 15 98% 31.49 kg/m2        Blood Pressure from Last 3 Encounters:   11/10/17 132/80   04/24/17 141/88   04/17/17 132/81    Weight from Last 3 Encounters:   11/10/17 229 lb (103.9 kg)   10/03/17 227 lb (103 kg)   05/30/17 230 lb (104.3 kg)              Today, you had the following     No orders found for display       Primary Care Provider Office Phone # Fax #    [facility] [phone] [address] [facility] [address]        Equal Access to Services     ELLI BROWN : Hadii aad ku hadasho Soomaali, waaxda luqadaha, qaybta kaalmada aderichieyahenrietta, dayanara azevedo . So [facility] [phone].    ATENCIÓN: Si habla español, tiene a akhtar disposición servicios gratuitos de asistencia lingüística. Suri al [phone].    We comply with applicable federal civil rights laws and [place] laws. We do not discriminate on the basis of race, color, national origin, age, disability, sex, sexual orientation, or gender identity.            Thank you!     Thank you for choosing [facility]  for your care. Our goal is always to provide you with excellent care. Hearing back from our patients is one way we can continue to improve our services. Please take a few minutes to complete the written survey that you may receive in the mail after your visit with us. Thank you!             Your Updated Medication List - Protect others around you: Learn how to safely use, store and throw away your medicines at www.disposemymeds.org.          This list is accurate as of: 11/10/17  5:26 PM.  Always use your most recent med list.                   Brand Name Dispense Instructions for use Diagnosis    fexofenadine-pseudoePHEDrine 180-240 MG per 24 hr tablet     ALLEGRA-D 24    30 tablet    Take 1 tablet by mouth daily    Seasonal allergic rhinitis due to pollen       fluticasone 27.5 MCG/SPRAY spray    VERAMYST     Spray 2 sprays into both nostrils daily

## 2017-11-10 NOTE — PROGRESS NOTES
SUBJECTIVE:   Navin Thomas is a 22 year old male who presents to clinic today for the following health issues:      Eye(s) Problem      Duration: 1 year off nad on. 4 days this time    Description:  Location: right  Pain: no  Redness: YES  Discharge: no    Accompanying signs and symptoms: itching    History (Trauma, foreign body exposure,): None    Precipitating or alleviating factors (contact use): None    Therapies tried and outcome: None    Started when he woke up  This past weekend he forgot to take his Flonase   He gets this often  He usually treats with neosporin   This time mom pressured him to be seen  More itchy than tender  Redness on the inside eye and upper eyelid     Problem list and histories reviewed & adjusted, as indicated.  Additional history: as documented    Patient Active Problem List   Diagnosis   (none) - all problems resolved or deleted     History reviewed. No pertinent surgical history.    Social History   Substance Use Topics     Smoking status: Never Smoker     Smokeless tobacco: Never Used     Alcohol use No     Family History   Problem Relation Age of Onset     Hypertension Father      Lipids Father      Hearing Loss Father      Hearing Loss Paternal Grandmother      Hearing Loss Paternal Grandfather          Current Outpatient Prescriptions   Medication Sig Dispense Refill     fluticasone (VERAMYST) 27.5 MCG/SPRAY spray Spray 2 sprays into both nostrils daily               Allergies   Allergen Reactions     Aloe Itching         Reviewed and updated as needed this visit by clinical staff     Reviewed and updated as needed this visit by Provider         ROS:  As in HPI      OBJECTIVE:     /80  Pulse 71  Temp 97.5  F (36.4  C) (Oral)  Resp 15  Wt 229 lb (103.9 kg)  SpO2 98%  BMI 31.49 kg/m2  Body mass index is 31.49 kg/(m^2).  GENERAL: healthy, alert and no distress  EYES: PERRL, EOMI and erythema adjacent to the right inner eye, and on right upper eyelid, not tender,  no drainage can be expressed  MS: no gross musculoskeletal defects noted, no edema  SKIN: no suspicious lesions or rashes  PSYCH: mentation appears normal, affect normal/bright    Diagnostic Test Results:  none     ASSESSMENT/PLAN:     1. Benign lesion of lacrimal duct, right  He should continue to take the Flonase daily  If it swells, becomes more red, or becomes painful he should return for further evaluation    Razia Pressley PA-C  [facility]

## 2017-11-10 NOTE — NURSING NOTE
"Chief Complaint   Patient presents with     Eye Problem     redness, swelling and itchy near right eye       Initial /80  Pulse 71  Temp 97.5  F (36.4  C) (Oral)  Resp 15  Wt 229 lb (103.9 kg)  SpO2 98%  BMI 31.49 kg/m2 Estimated body mass index is 31.49 kg/(m^2) as calculated from the following:    Height as of 10/3/17: 5' 11.5\" (1.816 m).    Weight as of this encounter: 229 lb (103.9 kg).  Medication Reconciliation: complete   Delfina Payan MA      "

## 2017-12-27 ENCOUNTER — OFFICE VISIT (OUTPATIENT)
Dept: FAMILY MEDICINE | Facility: CLINIC | Age: 22
End: 2017-12-27
Payer: COMMERCIAL

## 2017-12-27 VITALS
DIASTOLIC BLOOD PRESSURE: 76 MMHG | BODY MASS INDEX: 32.4 KG/M2 | WEIGHT: 235.6 LBS | TEMPERATURE: 98.4 F | HEART RATE: 64 BPM | SYSTOLIC BLOOD PRESSURE: 130 MMHG

## 2017-12-27 DIAGNOSIS — K12.1 STOMATITIS AND MUCOSITIS: Primary | ICD-10-CM

## 2017-12-27 DIAGNOSIS — K12.30 STOMATITIS AND MUCOSITIS: Primary | ICD-10-CM

## 2017-12-27 PROCEDURE — 99213 OFFICE O/P EST LOW 20 MIN: CPT | Performed by: FAMILY MEDICINE

## 2017-12-27 RX ORDER — SUCRALFATE ORAL 1 G/10ML
SUSPENSION ORAL
Qty: 60 ML | Refills: 1 | Status: SHIPPED | OUTPATIENT
Start: 2017-12-27 | End: 2018-05-21

## 2017-12-27 NOTE — MR AVS SNAPSHOT
"              After Visit Summary   2017    Navin Thomas    MRN: [id number]           Patient Information     Date Of Birth          1995        Visit Information        Provider Department      2017 3:15 PM Andrew Le MD [facility]        Today's Diagnoses     Stomatitis and mucositis    -  1       Follow-ups after your visit        Who to contact     If you have questions or need follow up information about today's clinic visit or your schedule please contact [facility] directly at [phone].  Normal or non-critical lab and imaging results will be communicated to you by MyChart, letter or phone within 4 business days after the clinic has received the results. If you do not hear from us within 7 days, please contact the clinic through Queplixhart or phone. If you have a critical or abnormal lab result, we will notify you by phone as soon as possible.  Submit refill requests through Intelen or call your pharmacy and they will forward the refill request to us. Please allow 3 business days for your refill to be completed.          Additional Information About Your Visit        MyChart Information     Intelen lets you send messages to your doctor, view your test results, renew your prescriptions, schedule appointments and more. To sign up, go to [email or web address] . Click on \"Log in\" on the left side of the screen, which will take you to the Welcome page. Then click on \"Sign up Now\" on the right side of the page.     You will be asked to enter the access code listed below, as well as some personal information. Please follow the directions to create your username and password.     Your access code is: [email or web address]  Expires: 2018  9:58 AM     Your access code will  in 90 days. If you need help or a new code, please call your [facility] or [phone].        Care EveryWhere ID     This is your Care EveryWhere ID. This could be used by other " organizations to access your [place] medical records  [id number]        Your Vitals Were     Pulse Temperature BMI (Body Mass Index)             64 98.4  F (36.9  C) (Oral) 32.4 kg/m2          Blood Pressure from Last 3 Encounters:   12/27/17 130/76   11/10/17 132/80   04/24/17 141/88    Weight from Last 3 Encounters:   12/27/17 235 lb 9.6 oz (106.9 kg)   11/10/17 229 lb (103.9 kg)   10/03/17 227 lb (103 kg)              Today, you had the following     No orders found for display         Today's Medication Changes          These changes are accurate as of: 12/27/17  3:40 PM.  If you have any questions, ask your nurse or doctor.               Start taking these medicines.        Dose/Directions    sucralfate 1 GM/10ML suspension   Commonly known as:  CARAFATE   Used for:  Stomatitis and mucositis   Started by:  Andrew Le MD        Apply four time per day to mouth lesion   Quantity:  60 mL   Refills:  1            Where to get your medicines      These medications were sent to [facility] [address] [facility], [address]  [id number] [place] [address]     Phone:  [phone]     sucralfate 1 GM/10ML suspension                Primary Care Provider Office Phone # Fax #    [facility] [phone] [address] [facility] [address]        Equal Access to Services     ELLI BROWN AH: Hadii tori poon hadasho Somahnazali, waaxda luqadaha, qaybta kaalmada rachel, dayanara recinos. So [facility] [phone].    ATENCIÓN: Si habla español, tiene a akhtar disposición servicios gratuitos de asistencia lingüística. Llame al [phone].    We comply with applicable federal civil rights laws and [place] laws. We do not discriminate on the basis of race, color, national origin, age, disability, sex, sexual orientation, or gender identity.            Thank you!     Thank you for choosing [facility]  for your  care. Our goal is always to provide you with excellent care. Hearing back from our patients is one way we can continue to improve our services. Please take a few minutes to complete the written survey that you may receive in the mail after your visit with us. Thank you!             Your Updated Medication List - Protect others around you: Learn how to safely use, store and throw away your medicines at www.disposemymeds.org.          This list is accurate as of: 12/27/17  3:40 PM.  Always use your most recent med list.                   Brand Name Dispense Instructions for use Diagnosis    fexofenadine-pseudoePHEDrine 180-240 MG per 24 hr tablet    ALLEGRA-D 24    30 tablet    Take 1 tablet by mouth daily    Seasonal allergic rhinitis due to pollen       fluticasone 27.5 MCG/SPRAY spray    VERAMYST     Spray 2 sprays into both nostrils daily        MULTIVITAMIN PO           sucralfate 1 GM/10ML suspension    CARAFATE    60 mL    Apply four time per day to mouth lesion    Stomatitis and mucositis

## 2017-12-27 NOTE — PROGRESS NOTES
SUBJECTIVE:  22 year old.The patient has a history of mouth sore.  This started 1 weeks ago. Location on right side of tongue quality stings Associated symptoms are none.  Brought on by unknown .  Better with nothing. ROS no fever or chills      Reviewed health maintenance  Patient Active Problem List   Diagnosis   (none) - all problems resolved or deleted     Past Medical History:   Diagnosis Date     NO ACTIVE PROBLEMS        OBJECTIVE:  no apparent distress  /76  Pulse 64  Temp 98.4  F (36.9  C) (Oral)  Wt 235 lb 9.6 oz (106.9 kg)  BMI 32.4 kg/m2  vesicle 1 cm under tongou  LUNGS:  CTA B/L, no wheezing or crackles.   Cardiovascular: negative, PMI normal. No lifts, heaves, or thrills. RRR. No murmurs, clicks gallops or rub   Gastrointestinal: Abdomen soft, non-tender. BS normal. No masses, organomegaly       ICD-10-CM    1. Stomatitis and mucositis K12.1 sucralfate (CARAFATE) 1 GM/10ML suspension    K12.30     PLAN: Retun to clinic if not improving

## 2017-12-27 NOTE — NURSING NOTE
"Chief Complaint   Patient presents with     Mouth Problem     sores in mouth x 1 week        Initial /76  Pulse 64  Temp 98.4  F (36.9  C) (Oral)  Wt 235 lb 9.6 oz (106.9 kg)  BMI 32.4 kg/m2 Estimated body mass index is 32.4 kg/(m^2) as calculated from the following:    Height as of 10/3/17: 5' 11.5\" (1.816 m).    Weight as of this encounter: 235 lb 9.6 oz (106.9 kg).  Medication Reconciliation: complete  Ruben Kumar CMA    "

## 2018-05-21 ENCOUNTER — OFFICE VISIT (OUTPATIENT)
Dept: FAMILY MEDICINE | Facility: CLINIC | Age: 23
End: 2018-05-21
Payer: COMMERCIAL

## 2018-05-21 VITALS
DIASTOLIC BLOOD PRESSURE: 79 MMHG | TEMPERATURE: 97.6 F | SYSTOLIC BLOOD PRESSURE: 137 MMHG | WEIGHT: 230 LBS | HEART RATE: 86 BPM | OXYGEN SATURATION: 100 % | BODY MASS INDEX: 31.63 KG/M2

## 2018-05-21 DIAGNOSIS — L50.9 URTICARIA: Primary | ICD-10-CM

## 2018-05-21 PROCEDURE — 99213 OFFICE O/P EST LOW 20 MIN: CPT | Performed by: FAMILY MEDICINE

## 2018-05-21 RX ORDER — LEVOCETIRIZINE DIHYDROCHLORIDE 5 MG/1
5 TABLET, FILM COATED ORAL EVERY EVENING
Qty: 30 TABLET | Refills: 0 | Status: SHIPPED | OUTPATIENT
Start: 2018-05-21 | End: 2018-08-20

## 2018-05-21 RX ORDER — PREDNISONE 20 MG/1
20 TABLET ORAL DAILY
Qty: 5 TABLET | Refills: 0 | Status: SHIPPED | OUTPATIENT
Start: 2018-05-21 | End: 2018-06-14

## 2018-05-21 NOTE — PATIENT INSTRUCTIONS
Understanding Hives (Urticaria)  Urticaria (hives) are red, itchy, and swollen areas on the skin. They are most often an allergic reaction from eating a food or taking a medicine. Sometimes the cause may be unknown. A single hive can vary in size from a half inch to several inches. Hives can appear all over the body. Or they may appear on only one part of the body.  What causes hives?  Hives can be caused by food and beverages such as:    Tree nuts (almonds, walnuts, hazelnuts)    Peanuts    Eggs    Shellfish    Milk  Hives can also be caused by medicines such as:    Antibiotics, especially penicillin and sulfa-based medicines     Anticonvulsant or antiseizure medicines     Chemotherapy medicines   Other causes of hives include:    Infection or virus    Heat    Cold air or cold water    Exercise    Scratching or rubbing your skin, or wearing tight-fitting clothes that rub your skin    Being exposed to sunlight or light from a light bulb, in rare cases    Inhaled-chemicals in the environment from foods and drugs, insects, plants, or other sources  In some cases, hives may occur again and again with no specific cause.  If you have hives    Stay away from the food, drink, medicine, or other thing that may be causing the hives.    Ask your healthcare provider how to control itchy or irritated skin.    Talk with your healthcare provider right away if you think a medicine gave you hives.  Watch for anaphylaxis  If you have hives, watch for symptoms of a severe reaction that can affect your entire body. This is called anaphylaxis. Symptoms can include swollen areas of the body, wheezing, trouble breathing or swallowing, and a hoarse voice. This reaction may happen right away. Or it may happen in an hour or more. In extreme cases, the airways from mouth to lungs may swell and make breathing difficult. This is a medical emergency. Use epinephrine medicine if you have it, and call 911 or go to the emergency room.     When  to call your healthcare provider  Call your healthcare provider if:    Your hives feel uncomfortable    You have never had hives before    Your symptoms don't go away or come back    Your symptoms get worse or new symptoms develop such as:   ? Sneezing, coughing, runny or stuffy nose  ? Itching of the eyes, nose, or roof of the mouth  ? Itching, burning, stinging, or pain  ? Dry, flaky, cracking, or scaly skin  ? Red or purple spots  Call 911  Call 911 right away if you have:    Swelling in your lips, tongue, or throat, called angioedema    Drooling    Trouble breathing, talking, or swallowing    Cool, moist or pale (blue in color) skin    Fast and weak heartbeat    Wheezing or short of breath    Feeling lightheaded or confused    Diarrhea    Severe nausea or vomiting    Seizure    Feeling dizzy or weak, or a sudden drop in blood pressure   Date Last Reviewed: 4/1/2017 2000-2017 [employer]. [address] [place] [address], [place] [address]. All rights reserved. This information is not intended as a substitute for professional medical care. Always follow your healthcare professional's instructions.

## 2018-05-21 NOTE — MR AVS SNAPSHOT
"              After Visit Summary   2018    Navin Thomas    MRN: [id number]           Patient Information     Date Of Birth          1995        Visit Information        Provider Department      2018 10:00 AM Marian Powers MD [facility]        Today's Diagnoses     Chronic rhinitis, unspecified type    -  1    Urticaria           Follow-ups after your visit        Follow-up notes from your care team     Return if symptoms worsen or fail to improve.      Who to contact     Normal or non-critical lab and imaging results will be communicated to you by SwipeToSpinhart, letter or phone within 4 business days after the clinic has received the results. If you do not hear from us within 7 days, please contact the clinic through SwipeToSpinhart or phone. If you have a critical or abnormal lab result, we will notify you by phone as soon as possible.  Submit refill requests through Vistaar or call your pharmacy and they will forward the refill request to us. Please allow 3 business days for your refill to be completed.          If you need to speak with a  for additional information , please call: [phone]             Additional Information About Your Visit        MyCharOberon Media Information     Vistaar lets you send messages to your doctor, view your test results, renew your prescriptions, schedule appointments and more. To sign up, go to [email or web address] . Click on \"Log in\" on the left side of the screen, which will take you to the Welcome page. Then click on \"Sign up Now\" on the right side of the page.     You will be asked to enter the access code listed below, as well as some personal information. Please follow the directions to create your username and password.     Your access code is: [email or web address]  Expires: 2018 10:23 AM     Your access code will  in 90 days. If you need help or a new code, please call your [facility] or [phone].        Care EveryWhere ID  "    This is your Care EveryWhere ID. This could be used by other organizations to access your [facility] medical records  [id number]        Your Vitals Were     Pulse Temperature Pulse Oximetry BMI (Body Mass Index)          86 97.6  F (36.4  C) (Tympanic) 100% 31.63 kg/m2         Blood Pressure from Last 3 Encounters:   05/21/18 137/79   12/27/17 130/76   11/10/17 132/80    Weight from Last 3 Encounters:   05/21/18 230 lb (104.3 kg)   12/27/17 235 lb 9.6 oz (106.9 kg)   11/10/17 229 lb (103.9 kg)              Today, you had the following     No orders found for display         Today's Medication Changes          These changes are accurate as of 5/21/18 10:23 AM.  If you have any questions, ask your nurse or doctor.               Start taking these medicines.        Dose/Directions    levocetirizine 5 MG tablet   Commonly known as:  XYZAL   Used for:  Urticaria   Started by:  Marian Powers MD        Dose:  5 mg   Take 1 tablet (5 mg) by mouth every evening   Quantity:  30 tablet   Refills:  0       predniSONE 20 MG tablet   Commonly known as:  DELTASONE   Used for:  Urticaria   Started by:  Marian Powers MD        Dose:  20 mg   Take 1 tablet (20 mg) by mouth daily   Quantity:  5 tablet   Refills:  0            Where to get your medicines      These medications were sent to [facility] - [address] [facility]  [address] [facility] [address] [facility] [address] [facility] [address] [address]     Phone:  [phone]     levocetirizine 5 MG tablet    predniSONE 20 MG tablet                Primary Care Provider Office Phone # Fax #    [facility] [phone] [address] [facility] [address]        Equal Access to Services     ELLI BROWN AH: Alonzo Gallagher, chema lujuana, dre kaalmada rachel, dayanara recinos. So [facility] [phone].    ATENCIÓN: Si habla español, tiene a akhtar disposición servicios gratuitos de asistencia lingüística. Llame al  [phone].    We comply with applicable federal civil rights laws and [place] laws. We do not discriminate on the basis of race, color, national origin, age, disability, sex, sexual orientation, or gender identity.            Thank you!     Thank you for choosing [facility]  for your care. Our goal is always to provide you with excellent care. Hearing back from our patients is one way we can continue to improve our services. Please take a few minutes to complete the written survey that you may receive in the mail after your visit with us. Thank you!             Your Updated Medication List - Protect others around you: Learn how to safely use, store and throw away your medicines at www.disposemymeds.org.          This list is accurate as of 5/21/18 10:23 AM.  Always use your most recent med list.                   Brand Name Dispense Instructions for use Diagnosis    fluticasone 27.5 MCG/SPRAY spray    VERAMYST     Spray 2 sprays into both nostrils daily        levocetirizine 5 MG tablet    XYZAL    30 tablet    Take 1 tablet (5 mg) by mouth every evening    Urticaria       MULTIVITAMIN PO           predniSONE 20 MG tablet    DELTASONE    5 tablet    Take 1 tablet (20 mg) by mouth daily    Urticaria

## 2018-05-21 NOTE — PROGRESS NOTES
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   Recent travel: no     Alleviating factors:  none    Therapies Tried and outcome: benadryl, hydrocortisone cream- no relief.       Additional Concern    Eye Problem x3 days  Swelling around right eye, slightly better from yesterday.   Itchiness on top of eye lid, burning sensation with touching.         Problem list and histories reviewed & adjusted, as indicated.  Additional history: as documented    Patient Active Problem List   Diagnosis   (none) - all problems resolved or deleted     No past surgical history on file.    Social History   Substance Use Topics     Smoking status: Never Smoker     Smokeless tobacco: Never Used     Alcohol use No     Family History   Problem Relation Age of Onset     Hypertension Father      Lipids Father      Hearing Loss Father      Hearing Loss Paternal Grandmother      Hearing Loss Paternal Grandfather          Current Outpatient Prescriptions   Medication Sig Dispense Refill     fluticasone (VERAMYST) 27.5 MCG/SPRAY spray Spray 2 sprays into both nostrils daily       levocetirizine (XYZAL) 5 MG tablet Take 1 tablet (5 mg) by mouth every evening 30 tablet 0     Multiple Vitamins-Minerals (MULTIVITAMIN PO)        predniSONE (DELTASONE) 20 MG tablet Take 1 tablet (20 mg) by mouth daily 5 tablet 0     Allergies   Allergen Reactions     Aloe Itching       Reviewed and updated as needed this visit by clinical  staff  Tobacco  Allergies  Meds       Reviewed and updated as needed this visit by Provider         ROS:  Constitutional, HEENT, cardiovascular, pulmonary, gi and gu systems are negative, except as otherwise noted.    OBJECTIVE:     /79  Pulse 86  Temp 97.6  F (36.4  C) (Tympanic)  Wt 230 lb (104.3 kg)  SpO2 100%  BMI 31.63 kg/m2  Body mass index is 31.63 kg/(m^2).  GENERAL: healthy, alert and no distress  EYES: Eyes grossly normal to inspection, PERRL and conjunctivae and sclerae normal  SKIN: urticarial rash around the right eye, left side of neck, upper trunk and lower extremities.     Diagnostic Test Results:  none     ASSESSMENT/PLAN:     Navin was seen today for eye problem and derm problem.    Diagnoses and all orders for this visit:    Urticaria  -     levocetirizine (XYZAL) 5 MG tablet; Take 1 tablet (5 mg) by mouth every evening  -     predniSONE (DELTASONE) 20 MG tablet; Take 1 tablet (20 mg) by mouth daily      Patient education and Handout given       Follow up if symptoms fail to improve or worsen.      The patient was in agreement with the plan today and had no questions or concerns prior to leaving the clinic.        Marian Powers MD  [facility]

## 2018-06-14 ENCOUNTER — OFFICE VISIT (OUTPATIENT)
Dept: FAMILY MEDICINE | Facility: CLINIC | Age: 23
End: 2018-06-14
Payer: COMMERCIAL

## 2018-06-14 VITALS
SYSTOLIC BLOOD PRESSURE: 137 MMHG | RESPIRATION RATE: 16 BRPM | TEMPERATURE: 98.2 F | WEIGHT: 229 LBS | BODY MASS INDEX: 31.49 KG/M2 | DIASTOLIC BLOOD PRESSURE: 83 MMHG | OXYGEN SATURATION: 98 % | HEART RATE: 94 BPM

## 2018-06-14 DIAGNOSIS — G56.03 BILATERAL CARPAL TUNNEL SYNDROME: ICD-10-CM

## 2018-06-14 DIAGNOSIS — L23.9 ALLERGIC CONTACT DERMATITIS, UNSPECIFIED TRIGGER: Primary | ICD-10-CM

## 2018-06-14 PROCEDURE — 99213 OFFICE O/P EST LOW 20 MIN: CPT | Performed by: FAMILY MEDICINE

## 2018-06-14 RX ORDER — PREDNISONE 20 MG/1
TABLET ORAL
Qty: 24 TABLET | Refills: 0 | Status: SHIPPED | OUTPATIENT
Start: 2018-06-14 | End: 2018-06-29

## 2018-06-14 ASSESSMENT — PAIN SCALES - GENERAL: PAINLEVEL: NO PAIN (0)

## 2018-06-14 NOTE — MR AVS SNAPSHOT
"              After Visit Summary   2018    Navin Thomas    MRN: [id number]           Patient Information     Date Of Birth          1995        Visit Information        Provider Department      2018 4:20 PM Adam Cervantes MD [facility]        Today's Diagnoses     Allergic contact dermatitis, unspecified trigger    -  1    Bilateral carpal tunnel syndrome           Follow-ups after your visit        Who to contact     If you have questions or need follow up information about today's clinic visit or your schedule please contact [facility] directly at [phone].  Normal or non-critical lab and imaging results will be communicated to you by Life360hart, letter or phone within 4 business days after the clinic has received the results. If you do not hear from us within 7 days, please contact the clinic through Life360hart or phone. If you have a critical or abnormal lab result, we will notify you by phone as soon as possible.  Submit refill requests through ParAccel or call your pharmacy and they will forward the refill request to us. Please allow 3 business days for your refill to be completed.          Additional Information About Your Visit        MyChart Information     ParAccel lets you send messages to your doctor, view your test results, renew your prescriptions, schedule appointments and more. To sign up, go to [email or web address] . Click on \"Log in\" on the left side of the screen, which will take you to the Welcome page. Then click on \"Sign up Now\" on the right side of the page.     You will be asked to enter the access code listed below, as well as some personal information. Please follow the directions to create your username and password.     Your access code is: [email or web address]  Expires: 2018 10:23 AM     Your access code will  in 90 days. If you need help or a new code, please call your [facility] or [phone].        Care EveryWhere ID     " This is your Care EveryWhere ID. This could be used by other organizations to access your [facility] medical records  [id number]        Your Vitals Were     Pulse Temperature Respirations Pulse Oximetry BMI (Body Mass Index)       94 98.2  F (36.8  C) (Oral) 16 98% 31.49 kg/m2        Blood Pressure from Last 3 Encounters:   06/14/18 137/83   05/21/18 137/79   12/27/17 130/76    Weight from Last 3 Encounters:   06/14/18 229 lb (103.9 kg)   05/21/18 230 lb (104.3 kg)   12/27/17 235 lb 9.6 oz (106.9 kg)              Today, you had the following     No orders found for display         Today's Medication Changes          These changes are accurate as of 6/14/18  4:50 PM.  If you have any questions, ask your nurse or doctor.               Start taking these medicines.        Dose/Directions    predniSONE 20 MG tablet   Commonly known as:  DELTASONE   Used for:  Allergic contact dermatitis, unspecified trigger   Started by:  Adam Cervantes MD        3 daily for 4 days, then 2 daily for 4 days, then 1 daily for 4 days.   Quantity:  24 tablet   Refills:  0            Where to get your medicines      These medications were sent to [facility] [address], [facility] [address]  [address] [place], [place] [address]     Phone:  [phone]     predniSONE 20 MG tablet                Primary Care Provider Office Phone # Fax #    Andrew Teodoro Le [phone] [address] [place] [address]        Equal Access to Services     ELLI BROWN : Hadii tori jarao Sokvng, waaxda luqadaha, qaybta kaalmada rachel, dayanara recinos. So [facility] [phone].    ATENCIÓN: Si habla español, tiene a akhtar disposición servicios gratuitos de asistencia lingüística. Llame al [phone].    We comply with applicable federal civil rights laws and [place] laws. We do not discriminate on the basis of race, color, national origin, age, disability,  sex, sexual orientation, or gender identity.            Thank you!     Thank you for choosing [facility]  for your care. Our goal is always to provide you with excellent care. Hearing back from our patients is one way we can continue to improve our services. Please take a few minutes to complete the written survey that you may receive in the mail after your visit with us. Thank you!             Your Updated Medication List - Protect others around you: Learn how to safely use, store and throw away your medicines at www.disposemymeds.org.          This list is accurate as of 6/14/18  4:50 PM.  Always use your most recent med list.                   Brand Name Dispense Instructions for use Diagnosis    fluticasone 27.5 MCG/SPRAY spray    VERAMYST     Spray 2 sprays into both nostrils daily        levocetirizine 5 MG tablet    XYZAL    30 tablet    Take 1 tablet (5 mg) by mouth every evening    Urticaria       predniSONE 20 MG tablet    DELTASONE    24 tablet    3 daily for 4 days, then 2 daily for 4 days, then 1 daily for 4 days.    Allergic contact dermatitis, unspecified trigger

## 2018-06-14 NOTE — NURSING NOTE
"Chief Complaint   Patient presents with     Derm Problem     2  months     Numbness     numbness on both hand, noticed 2 weeks ago.        Initial /83  Pulse 94  Temp 98.2  F (36.8  C) (Oral)  Resp 16  Wt 229 lb (103.9 kg)  SpO2 98%  BMI 31.49 kg/m2 Estimated body mass index is 31.49 kg/(m^2) as calculated from the following:    Height as of 10/3/17: 5' 11.5\" (1.816 m).    Weight as of this encounter: 229 lb (103.9 kg).  Medication Reconciliation: complete    DUSTY Martinez MA    "

## 2018-06-14 NOTE — PROGRESS NOTES
CHIEF COMPLAINT    Check rash  Numbness in hands      HISTORY    He has persistent rash. Took low dose prednisone w/o improvement.    Also some bilat hand numbness for 2 weeks.    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    Patient Active Problem List   Diagnosis   (none) - all problems resolved or deleted       REVIEW OF SYSTEMS    Unremarkable except as above.      EXAM  /83  Pulse 94  Temp 98.2  F (36.8  C) (Oral)  Resp 16  Wt 229 lb (103.9 kg)  SpO2 98%  BMI 31.49 kg/m2    Patchy / streaky rash with some vesicles      (L23.9) Allergic contact dermatitis, unspecified trigger  (primary encounter diagnosis)  Comment:   Plan: predniSONE (DELTASONE) 20 MG tablet            (G56.03) Bilateral carpal tunnel syndrome  Comment: advised. May be from new job. Could try splinting at night.  Plan: F/U if persistent.

## 2018-06-29 ENCOUNTER — OFFICE VISIT (OUTPATIENT)
Dept: FAMILY MEDICINE | Facility: CLINIC | Age: 23
End: 2018-06-29
Payer: COMMERCIAL

## 2018-06-29 VITALS
WEIGHT: 227 LBS | SYSTOLIC BLOOD PRESSURE: 135 MMHG | TEMPERATURE: 99.3 F | HEART RATE: 82 BPM | RESPIRATION RATE: 18 BRPM | BODY MASS INDEX: 31.22 KG/M2 | DIASTOLIC BLOOD PRESSURE: 84 MMHG | OXYGEN SATURATION: 99 %

## 2018-06-29 DIAGNOSIS — J03.90 TONSILLITIS: Primary | ICD-10-CM

## 2018-06-29 DIAGNOSIS — R07.0 THROAT PAIN: ICD-10-CM

## 2018-06-29 LAB
DEPRECATED S PYO AG THROAT QL EIA: NORMAL
SPECIMEN SOURCE: NORMAL

## 2018-06-29 PROCEDURE — 99213 OFFICE O/P EST LOW 20 MIN: CPT | Performed by: PHYSICIAN ASSISTANT

## 2018-06-29 PROCEDURE — 87880 STREP A ASSAY W/OPTIC: CPT | Performed by: PHYSICIAN ASSISTANT

## 2018-06-29 PROCEDURE — 87081 CULTURE SCREEN ONLY: CPT | Performed by: PHYSICIAN ASSISTANT

## 2018-06-29 RX ORDER — PENICILLIN V POTASSIUM 500 MG/1
500 TABLET, FILM COATED ORAL 2 TIMES DAILY
Qty: 20 TABLET | Refills: 0 | Status: SHIPPED | OUTPATIENT
Start: 2018-06-29 | End: 2018-07-09

## 2018-06-29 ASSESSMENT — ENCOUNTER SYMPTOMS
FEVER: 1
EYE PAIN: 0
CARDIOVASCULAR NEGATIVE: 1
WEIGHT LOSS: 0
GASTROINTESTINAL NEGATIVE: 1
RESPIRATORY NEGATIVE: 1
PALPITATIONS: 0
HEMOPTYSIS: 0
SORE THROAT: 1
DIAPHORESIS: 0
COUGH: 0

## 2018-06-29 ASSESSMENT — PAIN SCALES - GENERAL: PAINLEVEL: MILD PAIN (3)

## 2018-06-29 NOTE — MR AVS SNAPSHOT
"              After Visit Summary   2018    Navin Thomas    MRN: [id number]           Patient Information     Date Of Birth          1995        Visit Information        Provider Department      2018 12:20 PM Barby Dow PA-C [facility]        Today's Diagnoses     Tonsillitis    -  1    Throat pain           Follow-ups after your visit        Who to contact     If you have questions or need follow up information about today's clinic visit or your schedule please contact [facility] directly at [phone].  Normal or non-critical lab and imaging results will be communicated to you by MEEPhart, letter or phone within 4 business days after the clinic has received the results. If you do not hear from us within 7 days, please contact the clinic through MEEPhart or phone. If you have a critical or abnormal lab result, we will notify you by phone as soon as possible.  Submit refill requests through MyKontiki ([employer]) or call your pharmacy and they will forward the refill request to us. Please allow 3 business days for your refill to be completed.          Additional Information About Your Visit        MyChart Information     MyKontiki ([employer]) lets you send messages to your doctor, view your test results, renew your prescriptions, schedule appointments and more. To sign up, go to [email or web address] ([employer]) . Click on \"Log in\" on the left side of the screen, which will take you to the Welcome page. Then click on \"Sign up Now\" on the right side of the page.     You will be asked to enter the access code listed below, as well as some personal information. Please follow the directions to create your username and password.     Your access code is: [email or web address]  Expires: 2018 12:16 PM     Your access code will  in 90 days. If you need help or a new code, please call your [facility] or [phone].        Care EveryWhere ID     This is your Care EveryWhere ID. This could be used by other " organizations to access your [facility] medical records  [id number]        Your Vitals Were     Pulse Temperature Respirations Pulse Oximetry BMI (Body Mass Index)       82 99.3  F (37.4  C) (Oral) 18 99% 31.22 kg/m2        Blood Pressure from Last 3 Encounters:   06/29/18 135/84   06/14/18 137/83   05/21/18 137/79    Weight from Last 3 Encounters:   06/29/18 227 lb (103 kg)   06/14/18 229 lb (103.9 kg)   05/21/18 230 lb (104.3 kg)              We Performed the Following     Beta strep group A culture     Rapid strep screen          Today's Medication Changes          These changes are accurate as of 6/29/18 12:44 PM.  If you have any questions, ask your nurse or doctor.               Start taking these medicines.        Dose/Directions    lidocaine (viscous) 2 % solution   Commonly known as:  XYLOCAINE   Used for:  Throat pain   Started by:  Braby Dow PA-C        Dose:  15 mL   Take 15 mLs by mouth every 3 hours as needed for moderate pain swish and spit; max 8 doses/24 hour period   Quantity:  100 mL   Refills:  0       penicillin V potassium 500 MG tablet   Commonly known as:  VEETID   Used for:  Tonsillitis   Started by:  Barby Dow PA-C        Dose:  500 mg   Take 1 tablet (500 mg) by mouth 2 times daily for 10 days   Quantity:  20 tablet   Refills:  0            Where to get your medicines      These medications were sent to [facility] [address], [facility] [address]  [address] [place] [address], [facility] [address]     Phone:  [phone]     lidocaine (viscous) 2 % solution    penicillin V potassium 500 MG tablet                Primary Care Provider Office Phone # Fax #    Andrew Le [phone] [address] [facility] [address]        Equal Access to Services     NOEL BROWN : Alonzo Gallagher, waaxda luqadaha, qaybta kaalmada aderichieyahenrietta, dayanara recinos. So [facility] [place] [facility] [phone].    ATENCIÓN: Si  vandana gant, tiene a akhtar disposición servicios gratuitos de asistencia lingüística. Suri blandon [phone].    We comply with applicable federal civil rights laws and [place] laws. We do not discriminate on the basis of race, color, national origin, age, disability, sex, sexual orientation, or gender identity.            Thank you!     Thank you for choosing [facility]  for your care. Our goal is always to provide you with excellent care. Hearing back from our patients is one way we can continue to improve our services. Please take a few minutes to complete the written survey that you may receive in the mail after your visit with us. Thank you!             Your Updated Medication List - Protect others around you: Learn how to safely use, store and throw away your medicines at www.disposemymeds.org.          This list is accurate as of 6/29/18 12:44 PM.  Always use your most recent med list.                   Brand Name Dispense Instructions for use Diagnosis    fluticasone 27.5 MCG/SPRAY spray    VERAMYST     Spray 2 sprays into both nostrils daily        levocetirizine 5 MG tablet    XYZAL    30 tablet    Take 1 tablet (5 mg) by mouth every evening    Urticaria       lidocaine (viscous) 2 % solution    XYLOCAINE    100 mL    Take 15 mLs by mouth every 3 hours as needed for moderate pain swish and spit; max 8 doses/24 hour period    Throat pain       penicillin V potassium 500 MG tablet    VEETID    20 tablet    Take 1 tablet (500 mg) by mouth 2 times daily for 10 days    Tonsillitis

## 2018-06-29 NOTE — LETTER
[facility]  [address] [place] [address]    July 2, 2018    Navin Thomas  [address] [facility] [address]          Dear Navin,    Enclosed is a copy of your results. Your strep culture is negative.     Results for orders placed or performed in visit on 06/29/18   Rapid strep screen   Result Value Ref Range    Specimen Description Throat     Rapid Strep A Screen       NEGATIVE: No Group A streptococcal antigen detected by immunoassay, await culture report.   Beta strep group A culture   Result Value Ref Range    Specimen Description Throat     Culture Micro No beta hemolytic Streptococcus Group A isolated        If you have any questions or concerns, please call myself or my nurse at [phone].      Sincerely,        Barby See Paloma HONG/gabriela

## 2018-06-29 NOTE — NURSING NOTE
"Chief Complaint   Patient presents with     Pharyngitis     Health Maintenance     PHQ2       Initial /84  Pulse 82  Temp 99.3  F (37.4  C) (Oral)  Resp 18  Wt 227 lb (103 kg)  SpO2 99%  BMI 31.22 kg/m2 Estimated body mass index is 31.22 kg/(m^2) as calculated from the following:    Height as of 10/3/17: 5' 11.5\" (1.816 m).    Weight as of this encounter: 227 lb (103 kg).  Medication Reconciliation: complete  Zahira Han CMA    "

## 2018-06-29 NOTE — PROGRESS NOTES
SUBJECTIVE:     HPI  Navin Thomas is a 23 year old male who presents to clinic today for the following health issues:  Sore Throat     Duration: 2 days    Description  sore throat, post nasal drainage and low grade fever    Severity: mild    Accompanying signs and symptoms: No cough, shortness of breath or wheezing.  No sinus congestion/pain/pressure.  No abdominal pain, n/v, constipation, diarrhea, bloody or black tarry stools.  No more fatigue than usual.    History (predisposing factors):  No ill contacts.  No pmh of asthma.  Non-smoker.    Precipitating or alleviating factors: None    Therapies tried and outcome:  Rest and fluids with minimal relief       Reviewed PMH.  Patient Active Problem List   Diagnosis   (none) - all problems resolved or deleted     Current Outpatient Prescriptions   Medication Sig Dispense Refill     fluticasone (VERAMYST) 27.5 MCG/SPRAY spray Spray 2 sprays into both nostrils daily       levocetirizine (XYZAL) 5 MG tablet Take 1 tablet (5 mg) by mouth every evening 30 tablet 0     Allergies   Allergen Reactions     Aloe Itching       Review of Systems   Constitutional: Positive for fever. Negative for diaphoresis and weight loss.   HENT: Positive for sore throat.    Eyes: Negative for pain.   Respiratory: Negative.  Negative for cough and hemoptysis.    Cardiovascular: Negative.  Negative for chest pain and palpitations.   Gastrointestinal: Negative.    Skin: Negative.    All other systems reviewed and are negative.      /84  Pulse 82  Temp 99.3  F (37.4  C) (Oral)  Resp 18  Wt 227 lb (103 kg)  SpO2 99%  BMI 31.22 kg/m2  Physical Exam   Constitutional: He is oriented to person, place, and time and well-developed, well-nourished, and in no distress. No distress.   HENT:   Head: Normocephalic and atraumatic.   Nose: Nose normal.   Mouth/Throat: Uvula is midline and mucous membranes are normal. Posterior oropharyngeal erythema present. No oropharyngeal exudate, posterior  oropharyngeal edema or tonsillar abscesses.   TMs are intact without any erythema or bulging bilaterally.  Airway is patent.   Eyes: Conjunctivae and EOM are normal. Pupils are equal, round, and reactive to light. No scleral icterus.   Neck: Normal range of motion. Neck supple. No thyromegaly present.   Cardiovascular: Normal rate, regular rhythm, normal heart sounds and intact distal pulses.  Exam reveals no gallop and no friction rub.    No murmur heard.  Pulmonary/Chest: Effort normal and breath sounds normal. No respiratory distress. He has no wheezes. He has no rales.   Lymphadenopathy:        Head (right side): Tonsillar adenopathy present.        Head (left side): Tonsillar adenopathy present.     He has no cervical adenopathy.   Neurological: He is alert and oriented to person, place, and time.   Skin: Skin is warm and dry. No rash noted.   Psychiatric: Mood and affect normal.   Nursing note and vitals reviewed.        Assessment/Plan:  Tonsillitis:  RST is negative, will send for throat culture.  Will empirically treat with penicillin [id number] based on H&P and give lidocaine oral viscous as needed for sore throat.  Recommend tylenol/ibuprofen prn pain/fever, warm salt water gargles, lozenges or cough drops.  Recheck in clinic if symptoms worsen or if symptoms do not improve.    -     penicillin V potassium (VEETID) 500 MG tablet; Take 1 tablet (500 mg) by mouth 2 times daily for 10 days    Throat pain  -     Rapid strep screen  -     Beta strep group A culture  -     lidocaine, viscous, (XYLOCAINE) 2 % solution; Take 15 mLs by mouth every 3 hours as needed for moderate pain swish and spit; max 8 doses/24 hour period          Barby Dow PA-C

## 2018-06-30 LAB
BACTERIA SPEC CULT: NORMAL
SPECIMEN SOURCE: NORMAL

## 2018-08-20 ENCOUNTER — OFFICE VISIT (OUTPATIENT)
Dept: FAMILY MEDICINE | Facility: CLINIC | Age: 23
End: 2018-08-20
Payer: OTHER MISCELLANEOUS

## 2018-08-20 VITALS
OXYGEN SATURATION: 98 % | BODY MASS INDEX: 31.63 KG/M2 | DIASTOLIC BLOOD PRESSURE: 81 MMHG | SYSTOLIC BLOOD PRESSURE: 130 MMHG | WEIGHT: 230 LBS | HEART RATE: 76 BPM | RESPIRATION RATE: 15 BRPM | TEMPERATURE: 98 F

## 2018-08-20 DIAGNOSIS — L23.7 CONTACT DERMATITIS DUE TO POISON IVY: Primary | ICD-10-CM

## 2018-08-20 PROCEDURE — 99213 OFFICE O/P EST LOW 20 MIN: CPT | Performed by: NURSE PRACTITIONER

## 2018-08-20 RX ORDER — PREDNISONE 20 MG/1
TABLET ORAL
Qty: 20 TABLET | Refills: 0 | Status: SHIPPED | OUTPATIENT
Start: 2018-08-20 | End: 2019-03-11

## 2018-08-20 ASSESSMENT — PAIN SCALES - GENERAL: PAINLEVEL: NO PAIN (0)

## 2018-08-20 NOTE — PROGRESS NOTES
SUBJECTIVE:   Navin Thomas is a 23 year old male who presents to clinic today for the following health issues:    Patient presents with:  Derm Problem: possible Poison Ivy, c/o itchy skin x 2 days      Problem list and histories reviewed & adjusted, as indicated.  Additional history: as documented    Patient Active Problem List   Diagnosis   (none) - all problems resolved or deleted     History reviewed. No pertinent surgical history.    Social History   Substance Use Topics     Smoking status: Never Smoker     Smokeless tobacco: Never Used     Alcohol use No     Family History   Problem Relation Age of Onset     Hypertension Father      Lipids Father      Hearing Loss Father      Hearing Loss Paternal Grandmother      Hearing Loss Paternal Grandfather            Reviewed and updated as needed this visit by clinical staff  Tobacco  Allergies  Meds  Med Hx  Surg Hx  Fam Hx  Soc Hx      Reviewed and updated as needed this visit by Provider         ROS:  Constitutional, HEENT, cardiovascular, pulmonary, GI, , musculoskeletal, neuro, skin, endocrine and psych systems are negative, except as otherwise noted.    OBJECTIVE:     /81  Pulse 76  Temp 98  F (36.7  C) (Oral)  Resp 15  Wt 230 lb (104.3 kg)  SpO2 98%  BMI 31.63 kg/m2  Body mass index is 31.63 kg/(m^2).  GENERAL: healthy, alert and no distress  EYES: Eyes grossly normal to inspection, PERRL and conjunctivae and sclerae normal  HENT: ear canals and TM's normal, nose and mouth without ulcers or lesions  NECK: no adenopathy, no asymmetry, masses, or scars and thyroid normal to palpation  RESP: lungs clear to auscultation - no rales, rhonchi or wheezes  CV: regular rate and rhythm, normal S1 S2, no S3 or S4, no murmur, click or rub, no peripheral edema and peripheral pulses strong  SKIN: vesicular rash widespread arms legs torso        ASSESSMENT/PLAN:     1. Contact dermatitis due to poison ivy    - predniSONE (DELTASONE) 20 MG tablet; Take  3 tabs (60 mg) by mouth daily x 3 days, 2 tabs (40 mg) daily x 3 days, 1 tab (20 mg) daily x 3 days, then 1/2 tab (10 mg) x 3 days.  Dispense: 20 tablet; Refill: 0    Home treat and monitor symptoms, call or rtc if worsening or not improving  See Patient Instructions    ERINN Monreal [facility]

## 2018-08-20 NOTE — MR AVS SNAPSHOT
After Visit Summary   8/20/2018    Navin Thomas    MRN: [id number]           Patient Information     Date Of Birth          1995        Visit Information        Provider Department      8/20/2018 4:00 PM Carlene Howard APRN CNP [facility]        Today's Diagnoses     Contact dermatitis due to poison ivy    -  1       Follow-ups after your visit        Who to contact     If you have questions or need follow up information about today's clinic visit or your schedule please contact [facility] directly at [phone].  Normal or non-critical lab and imaging results will be communicated to you by MyChart, letter or phone within 4 business days after the clinic has received the results. If you do not hear from us within 7 days, please contact the clinic through MyChart or phone. If you have a critical or abnormal lab result, we will notify you by phone as soon as possible.  Submit refill requests through [employer] or call your pharmacy and they will forward the refill request to us. Please allow 3 business days for your refill to be completed.          Additional Information About Your Visit        Care EveryWhere ID     This is your Care EveryWhere ID. This could be used by other organizations to access your [facility] medical records  [id number]        Your Vitals Were     Pulse Temperature Respirations Pulse Oximetry BMI (Body Mass Index)       76 98  F (36.7  C) (Oral) 15 98% 31.63 kg/m2        Blood Pressure from Last 3 Encounters:   08/20/18 130/81   06/29/18 135/84   06/14/18 137/83    Weight from Last 3 Encounters:   08/20/18 230 lb (104.3 kg)   06/29/18 227 lb (103 kg)   06/14/18 229 lb (103.9 kg)              Today, you had the following     No orders found for display         Today's Medication Changes          These changes are accurate as of 8/20/18  4:04 PM.  If you have any questions, ask your nurse or doctor.               Start taking these  medicines.        Dose/Directions    predniSONE 20 MG tablet   Commonly known as:  DELTASONE   Used for:  Contact dermatitis due to poison ivy   Started by:  Carlene Howard APRN CNP        Take 3 tabs (60 mg) by mouth daily x 3 days, 2 tabs (40 mg) daily x 3 days, 1 tab (20 mg) daily x 3 days, then 1/2 tab (10 mg) x 3 days.   Quantity:  20 tablet   Refills:  0            Where to get your medicines      These medications were sent to [facility] [address], [facility] [address]  [address] [address], [facility] [address]     Phone:  [phone]     predniSONE 20 MG tablet                Primary Care Provider Office Phone # Fax #    Ravi Farley [phone] [address] [facility] [address]        Equal Access to Services     [facility]: Hadii tori poon hadasho Soomaali, waaxda luqadaha, qaybta kaalmada adeegyada, dayanara moser haymile azevedo . So [facility] [phone].    ATENCIÓN: Si habla español, tiene a akhtar disposición servicios gratuitos de asistencia lingüística. Llame al [phone].    We comply with applicable federal civil rights laws and [place] laws. We do not discriminate on the basis of race, color, national origin, age, disability, sex, sexual orientation, or gender identity.            Thank you!     Thank you for choosing [facility]  for your care. Our goal is always to provide you with excellent care. Hearing back from our patients is one way we can continue to improve our services. Please take a few minutes to complete the written survey that you may receive in the mail after your visit with us. Thank you!             Your Updated Medication List - Protect others around you: Learn how to safely use, store and throw away your medicines at www.disposemymeds.org.          This list is accurate as of 8/20/18  4:04 PM.  Always use your most recent med list.                   Brand Name Dispense Instructions for  use Diagnosis    fluticasone 27.5 MCG/SPRAY spray    VERAMYST     Spray 2 sprays into both nostrils daily        predniSONE 20 MG tablet    DELTASONE    20 tablet    Take 3 tabs (60 mg) by mouth daily x 3 days, 2 tabs (40 mg) daily x 3 days, 1 tab (20 mg) daily x 3 days, then 1/2 tab (10 mg) x 3 days.    Contact dermatitis due to poison ivy

## 2019-02-24 ENCOUNTER — NURSE TRIAGE (OUTPATIENT)
Dept: NURSING | Facility: CLINIC | Age: 24
End: 2019-02-24

## 2019-02-25 ENCOUNTER — OFFICE VISIT (OUTPATIENT)
Dept: ORTHOPEDICS | Facility: CLINIC | Age: 24
End: 2019-02-25
Payer: COMMERCIAL

## 2019-02-25 ENCOUNTER — ANCILLARY PROCEDURE (OUTPATIENT)
Dept: GENERAL RADIOLOGY | Facility: CLINIC | Age: 24
End: 2019-02-25
Attending: PEDIATRICS
Payer: COMMERCIAL

## 2019-02-25 VITALS
WEIGHT: 240 LBS | BODY MASS INDEX: 32.51 KG/M2 | HEIGHT: 72 IN | DIASTOLIC BLOOD PRESSURE: 82 MMHG | SYSTOLIC BLOOD PRESSURE: 122 MMHG

## 2019-02-25 DIAGNOSIS — M25.362 PATELLAR INSTABILITY OF LEFT KNEE: ICD-10-CM

## 2019-02-25 DIAGNOSIS — M25.562 LEFT KNEE PAIN, UNSPECIFIED CHRONICITY: Primary | ICD-10-CM

## 2019-02-25 DIAGNOSIS — M25.562 LEFT KNEE PAIN, UNSPECIFIED CHRONICITY: ICD-10-CM

## 2019-02-25 PROCEDURE — 73562 X-RAY EXAM OF KNEE 3: CPT

## 2019-02-25 PROCEDURE — 99203 OFFICE O/P NEW LOW 30 MIN: CPT | Performed by: PEDIATRICS

## 2019-02-25 ASSESSMENT — MIFFLIN-ST. JEOR: SCORE: 2121.63

## 2019-02-25 NOTE — TELEPHONE ENCOUNTER
"    Reason for Disposition    [1] Very swollen joint AND [2] no fever    Additional Information    Negative: Sounds like a life-threatening emergency to the triager    Negative: Followed a knee injury    Negative: Leg pain is main symptom    Negative: Knee swelling is main symptom    Negative: [1] Swollen joint AND [2] fever    Negative: [1] Red area or streak AND [2] fever    Negative: Patient sounds very sick or weak to the triager    Negative: [1] SEVERE pain (e.g., excruciating, unable to walk) AND [2] not improved after 2 hours of pain medicine    Negative: [1] Can't move swollen joint at all AND [2] no fever    Negative: [1] Thigh or calf pain AND [2] only 1 side AND [3] present > 1 hour    Negative: [1] Thigh, calf, or ankle swelling AND [2] only 1 side    Negative: [1] Looks infected (spreading redness, pus) AND [2] large red area (> 2 in. or 5 cm)    Answer Assessment - Initial Assessment Questions  1. LOCATION and RADIATION: \"Where is the pain located?\"       left knee  2. QUALITY: \"What does the pain feel like?\"  (e.g., sharp, dull, aching, burning)      Dull ache  3. SEVERITY: \"How bad is the pain?\" \"What does it keep you from doing?\"   (Scale 1-10; or mild, moderate, severe)    -  MILD (1-3): doesn't interfere with normal activities     -  MODERATE (4-7): interferes with normal activities (e.g., work or school) or awakens from sleep, limping     -  SEVERE (8-10): excruciating pain, unable to do any normal activities, unable to walk      limping  4. ONSET: \"When did the pain start?\" \"Does it come and go, or is it there all the time?\"      Originally 5 years ago but he started having pain again 2 days ago  5. RECURRENT: \"Have you had this pain before?\" If so, ask: \"When, and what happened then?\"      He injured the knee 5 years ago and treated the knee with physical therapy  6. SETTING: \"Has there been any recent work, exercise or other activity that involved that part of the body?\"       n/a  7. " "AGGRAVATING FACTORS: \"What makes the knee pain worse?\" (e.g., walking, climbing stairs, running)      Bearing weight  8. ASSOCIATED SYMPTOMS: \"Is there any swelling or redness of the knee?\"      swelling  9. OTHER SYMPTOMS: \"Do you have any other symptoms?\" (e.g., chest pain, difficulty breathing, fever, calf pain)      no  10. PREGNANCY: \"Is there any chance you are pregnant?\" \"When was your last menstrual period?\"        no    Protocols used: KNEE PAIN-ADULT-AH      "

## 2019-02-25 NOTE — PROGRESS NOTES
Sports Medicine Clinic Visit    PCP: Ravi Farley    Navin Thomas is a 23 year old male who is seen  as a self referral presenting with left knee pain.  He has a history of knee injury about 6 years ago, subluxing patella.  He feels that he has done this recurrently thru the years.  He was seen and had treatment for this 2 years ago and did imaging and PT.  He had improvement.  Pain increased 2 days ago while playing volleyball.  Pain just distal to patella, medially.      Injury: HX of subluxation   After injury ~2 years ago, did PT. Has not kept up with HEP from prior PT.  Feels like quad is weak.    Location of Pain: left anterior knee   Duration of Pain: 2 day(s) of acute pain on 6 years of knee issues   Rating of Pain at worst: 7/10  Rating of Pain Currently: 5/10  Symptoms are better with: Rest  Symptoms are worse with: flexion, stairs   Additional Features:   Positive: swelling, popping and instability   Negative: bruising, grinding, catching, locking, paresthesias, numbness, weakness, pain in other joints and systemic symptoms  Other evaluation and/or treatments so far consists of: x ray, MRI, PT  Prior History of related problems: ongoing     Social History: ecology researcher     Review of Systems  Musculoskeletal: as above  Remainder of review of systems is negative including constitutional, CV, pulmonary, GI, Skin and Neurologic except as noted in HPI or medical history.    Past Medical History:   Diagnosis Date     NO ACTIVE PROBLEMS      No past surgical history on file.  Family History   Problem Relation Age of Onset     Hypertension Father      Lipids Father      Hearing Loss Father      Hearing Loss Paternal Grandmother      Hearing Loss Paternal Grandfather      Social History     Socioeconomic History     Marital status: Single     Spouse name: Not on file     Number of children: Not on file     Years of education: Not on file     Highest education level: Not on file   Occupational  History     Not on file   Social Needs     Financial resource strain: Not on file     Food insecurity:     Worry: Not on file     Inability: Not on file     Transportation needs:     Medical: Not on file     Non-medical: Not on file   Tobacco Use     Smoking status: Never Smoker     Smokeless tobacco: Never Used   Substance and Sexual Activity     Alcohol use: No     Drug use: No     Sexual activity: Yes     Partners: Female   Lifestyle     Physical activity:     Days per week: Not on file     Minutes per session: Not on file     Stress: Not on file   Relationships     Social connections:     Talks on phone: Not on file     Gets together: Not on file     Attends Jew service: Not on file     Active member of club or organization: Not on file     Attends meetings of clubs or organizations: Not on file     Relationship status: Not on file     Intimate partner violence:     Fear of current or ex partner: Not on file     Emotionally abused: Not on file     Physically abused: Not on file     Forced sexual activity: Not on file   Other Topics Concern     Parent/sibling w/ CABG, MI or angioplasty before 65F 55M? Not Asked   Social History Narrative     Not on file       Objective  /82   Ht 1.829 m (6')   Wt 108.9 kg (240 lb)   BMI 32.55 kg/m      GENERAL APPEARANCE: healthy, alert and no distress   GAIT: antalgic  SKIN: no suspicious lesions or rashes  NEURO: Normal strength and tone, mentation intact and speech normal  PSYCH:  mentation appears normal and affect normal/bright  HEENT: no scleral icterus  CV: no extremity edema  RESP: nonlabored breathing    Left Knee exam    ROM:        Extension grossly intact; flexion mild limitation with pain    Inspection:       no visible ecchymosis        effusion noted mild    Skin:       no visible deformities       well perfused       capillary refill brisk    Patellar Motion:        Crepitus noted in the patellofemoral joint    Tender:        medial patellar  border       medial joint line    Non Tender:         remainder of knee area    Special Tests:        neg (-) Román       neg (-) Lachman       neg (-) anterior drawer       neg (-) posterior drawer       neg (-) varus       neg (-) valgus       Mild anterior pain with forced extension      Radiology  Visualized radiographs of left knee obtained today, and reviewed the images with the patient.  Impression: + effusion, with some calcifications anterior knee.    KNEE STANDING AP BILATERAL, SUNRISE BILATERAL, LATERAL LEFT  2/25/2019  3:03 PM      HISTORY:  Left knee pain, unspecified chronicity.                                                                      IMPRESSION:  1. Right knee two views: Unremarkable.  2. Left knee three views: Comparison 4/17/2017. Joint effusion.  Minimal lateral patellar subluxation. There are three long-standing  ossific/calcific densities anteriorly. At least one of these is felt  to represent a phlebolith. The other is chronic in nature and appears  outside of the joint. It is unclear if the third one is outside the  joint or represents a loose body.     EMIL DICKERSON MD  ========================================================    Visualized MRI of the left knee 4/17, and reviewed images and report with patient.  MRI demonstrates PF compartment abnormality.        EXAMINATION: MRI of the left knee without contrast.     DATE:  4/27/2017     HISTORY: Left knee medial joint line pain, with instability.     TECHNIQUE: Multiplanar, multisequence MR imaging of the left knee was  obtained using standard sequences in 3 orthogonal planes without the  use of intravenous or intra-articular gadolinium contrast.     Comparison:  Comparison radiographs dated 4/17/2017 were reviewed,  which demonstrated no acute bone abnormality.     FINDINGS:     In the medial compartment, the medial meniscus is intact. There is no  high-grade or full-thickness cartilage loss or subchondral edema.     In the  lateral compartment, the lateral meniscus is intact. There is  no high-grade or full-thickness cartilage loss or subchondral edema.     In the patellofemoral compartment, there are areas of high-grade  cartilage loss and cartilage fissuring along the lateral patellar  facet with subchondral edema, as seen on axial series 4 image 12. This  extends to the median ridge and minimally along the medial patellar  facet as seen on axial series 4 image 9. Question of mild thinning of  the articular cartilage along the far lateral aspect of the lateral  trochlear surface. Bone marrow edema is seen along the lateral femoral  condyle with an osteochondral impaction injury along the lateral  femoral condyle, as seen on axial series 4 image 20 and coronal series  8 image 20, extending to the articular surface. Tiny well-corticated  bone fragment along the medial aspect of the patella, in the region of  the medial retinaculum attachment to the patella, likely related to  prior injury. There is borderline patella jolly with an IS ratio of  1.2. Mild trochlear dysplasia.     The anterior and posterior cruciate ligaments are intact.     The tibial collateral ligament is intact. The anterior iliotibial  band, fibular collateral ligament, biceps femoris tendon, and  popliteus tendons are intact..      There is a small joint effusion. Trace fluid in the semimembranosus  medial gastrocnemius bursa. No joint bodies.     The extensor mechanism is intact and normal in appearance.                                                                        IMPRESSION:  1. Findings in the left knee concerning for prior lateral patellar  dislocation, possibly subacute on chronic, correlate clinically. There  is associated bony contusions along the lateral greater than medial  patellar facet with high-grade cartilage loss along the lateral  patellar facet with subchondral edema. An osteochondral impaction  injury with bone marrow edema along the  lateral femoral condyle. There  is a well-corticated bone fragment along the medial aspect of the  patella, suggestive of prior injury to the medial patellar  retinaculum. 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  2. Small left knee joint effusion.  3. The anterior and posterior cruciate ligaments, medial and lateral  supporting structures, and bilateral menisci are intact.     RUDI RODRIGUEZ MD      Assessment:  1. Left knee pain, unspecified chronicity    2. Patellar instability of left knee        Plan:  Discussed the assessment with the patient. Reviewed course.  Discussed icing, OTC medication.  Activity modification reviewed.  He prefers to continue with HEP from prior PT for now. May call for PT referral if desired.  We discussed potential for updating MRI of the knee, with previous MRI findings and ongoing knee issues. Hold for now, may reconsider pending course.  Follow up: 2 weeks if not improving well with HEP, sooner prn. May call for PT. May consider MRI if persistent issues.  Questions answered. The patient indicates understanding of these issues and agrees with the plan.    Jamari Lu DO, CAQ            Disclaimer: This note consists of symbols derived from keyboarding, dictation and/or voice recognition software. As a result, there may be errors in the script that have gone undetected. Please consider this when interpreting information found in this chart.

## 2019-02-25 NOTE — PATIENT INSTRUCTIONS
Continue with home exercises from prior physical therapy  May contact clinic for PT referral  We also discussed potential for obtaining an MRI if not improving with therapy exercises

## 2019-03-11 ENCOUNTER — OFFICE VISIT (OUTPATIENT)
Dept: ORTHOPEDICS | Facility: CLINIC | Age: 24
End: 2019-03-11
Payer: COMMERCIAL

## 2019-03-11 VITALS
WEIGHT: 239.2 LBS | BODY MASS INDEX: 32.4 KG/M2 | HEIGHT: 72 IN | DIASTOLIC BLOOD PRESSURE: 86 MMHG | SYSTOLIC BLOOD PRESSURE: 122 MMHG

## 2019-03-11 DIAGNOSIS — M25.362 PATELLAR INSTABILITY OF LEFT KNEE: ICD-10-CM

## 2019-03-11 DIAGNOSIS — M22.42 CHONDROMALACIA OF PATELLA, LEFT: Primary | ICD-10-CM

## 2019-03-11 PROCEDURE — 99213 OFFICE O/P EST LOW 20 MIN: CPT | Performed by: PEDIATRICS

## 2019-03-11 ASSESSMENT — MIFFLIN-ST. JEOR: SCORE: 2118

## 2019-03-11 NOTE — PATIENT INSTRUCTIONS
MRI left knee next - [facility] [phone]. Cost estimates can be provided by [facility] at [phone].

## 2019-03-11 NOTE — PROGRESS NOTES
"Sports Medicine Clinic Visit    PCP: Ravi Farley    Navin Thomas is a 23 year old male who is seen in f/u up for    Chondromalacia of patella, left  Patellar instability of left knee. Since last visit on 2/25/2019 patient has had worsened pain on and off. He denies any new injuries. He notes that he continues to have swelling. He notes pain under the knee cap and anterior medial joint line. He is getting occasional clicking. He denies buckling of the knee. He has been resting for 2 weeks. He has not started the home exercises as he is still having a fair amount of pain. He is continuing to use ibuprofen and ice PRN. He notes Wednesday he had minimal pain however woke up on Thursday and had increased pain again. He used a hot tub on Friday and the pain worsened on Saturday.   **  Some days good, some days bad. Some days can do reciprocal gait on stairs, other days limited mobility with pain.  Still swollen.  Some newer issues: a \"stabbing\" pain in the area of the patella; sometimes a click/catch sensation. No locking noted.      Review of Systems  All other systems reviewed and are negative unless noted above.    Past Medical History:   Diagnosis Date     NO ACTIVE PROBLEMS      PSHx: noncontributory      Objective  /86   Ht 1.829 m (6')   Wt 108.5 kg (239 lb 3.2 oz)   BMI 32.44 kg/m      GENERAL APPEARANCE: healthy, alert and no distress   GAIT: min antalgic  SKIN: no suspicious lesions or rashes  NEURO: Normal strength and tone, mentation intact and speech normal  PSYCH:  mentation appears normal and affect normal/bright  HEENT: no scleral icterus  CV: no extremity edema  RESP: nonlabored breathing      Exam  Knee (left):  Full active and passive range of motion with extension.   Flexion limited by ~10 degrees.   Range of motion limited by effusion, tightness.  Crepitus noted in the patellofemoral joint.  Mild to moderate effusion noted.  No tenderness to palpation along medial or lateral " patellar borders.   No tenderness to palpation at medial or lateral joint lines.  Román negative.  No instability noted with anterior, posterior drawer signs or Lachman.  No laxity with valgus or varus stressing.        Radiology  Prior imaging visualized/reviewed again with pt:    KNEE STANDING AP BILATERAL, SUNRISE BILATERAL, LATERAL LEFT  2/25/2019  3:03 PM      HISTORY:  Left knee pain, unspecified chronicity.                                                                      IMPRESSION:  1. Right knee two views: Unremarkable.  2. Left knee three views: Comparison 4/17/2017. Joint effusion.  Minimal lateral patellar subluxation. There are three long-standing  ossific/calcific densities anteriorly. At least one of these is felt  to represent a phlebolith. The other is chronic in nature and appears  outside of the joint. It is unclear if the third one is outside the  joint or represents a loose body.     EMIL DICKERSON MD  ========================================================    EXAMINATION: MRI of the left knee without contrast.     DATE:  4/27/2017     HISTORY: Left knee medial joint line pain, with instability.     TECHNIQUE: Multiplanar, multisequence MR imaging of the left knee was  obtained using standard sequences in 3 orthogonal planes without the  use of intravenous or intra-articular gadolinium contrast.     Comparison:  Comparison radiographs dated 4/17/2017 were reviewed,  which demonstrated no acute bone abnormality.     FINDINGS:     In the medial compartment, the medial meniscus is intact. There is no  high-grade or full-thickness cartilage loss or subchondral edema.     In the lateral compartment, the lateral meniscus is intact. There is  no high-grade or full-thickness cartilage loss or subchondral edema.     In the patellofemoral compartment, there are areas of high-grade  cartilage loss and cartilage fissuring along the lateral patellar  facet with subchondral edema, as seen on axial series  4 image 12. This  extends to the median ridge and minimally along the medial patellar  facet as seen on axial series 4 image 9. Question of mild thinning of  the articular cartilage along the far lateral aspect of the lateral  trochlear surface. Bone marrow edema is seen along the lateral femoral  condyle with an osteochondral impaction injury along the lateral  femoral condyle, as seen on axial series 4 image 20 and coronal series  8 image 20, extending to the articular surface. Tiny well-corticated  bone fragment along the medial aspect of the patella, in the region of  the medial retinaculum attachment to the patella, likely related to  prior injury. There is borderline patella jolly with an IS ratio of  1.2. Mild trochlear dysplasia.     The anterior and posterior cruciate ligaments are intact.     The tibial collateral ligament is intact. The anterior iliotibial  band, fibular collateral ligament, biceps femoris tendon, and  popliteus tendons are intact..      There is a small joint effusion. Trace fluid in the semimembranosus  medial gastrocnemius bursa. No joint bodies.     The extensor mechanism is intact and normal in appearance.                                                                        IMPRESSION:  1. Findings in the left knee concerning for prior lateral patellar  dislocation, possibly subacute on chronic, correlate clinically. There  is associated bony contusions along the lateral greater than medial  patellar facet with high-grade cartilage loss along the lateral  patellar facet with subchondral edema. An osteochondral impaction  injury with bone marrow edema along the lateral femoral condyle. There  is a well-corticated bone fragment along the medial aspect of the  patella, suggestive of prior injury to the medial patellar  retinaculum. 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  2. Small left knee joint effusion.  3. The anterior and posterior cruciate ligaments, medial and  lateral  supporting structures, and bilateral menisci are intact.     RUDI RODRIGUEZ MD        Assessment:  1. Chondromalacia of patella, left    2. Patellar instability of left knee        Plan:  Discussed the assessment with the patient. Reviewed course. Persistent swelling, waxing/waning course with pain; still with mechanical symptoms. Unable to do HEP from prior PT, with ongoing symptoms.  Has brace, fitted; has not been wearing much, but can use for support.  Plan MRI left knee next; see prior imaging above.  Pending MRI results, we discussed additional considerations including return to formal PT, referral to ortho surgeon, also potential for injection therapy (though I'm not currently in favor of a steroid injection in a young person).  Follow up: call with MRI results.  Questions answered. The patient indicates understanding of these issues and agrees with the plan.    Jamari Lu DO, CAQ          Disclaimer: This note consists of symbols derived from keyboarding, dictation and/or voice recognition software. As a result, there may be errors in the script that have gone undetected. Please consider this when interpreting information found in this chart.

## 2019-03-12 ENCOUNTER — TELEPHONE (OUTPATIENT)
Dept: ORTHOPEDICS | Facility: CLINIC | Age: 24
End: 2019-03-12

## 2019-03-12 ENCOUNTER — HOSPITAL ENCOUNTER (OUTPATIENT)
Dept: MRI IMAGING | Facility: CLINIC | Age: 24
Discharge: HOME | End: 2019-03-12
Attending: PEDIATRICS | Admitting: PEDIATRICS
Payer: COMMERCIAL

## 2019-03-12 DIAGNOSIS — M22.42 CHONDROMALACIA OF PATELLA, LEFT: Primary | ICD-10-CM

## 2019-03-12 DIAGNOSIS — M25.362 PATELLAR INSTABILITY OF LEFT KNEE: ICD-10-CM

## 2019-03-12 DIAGNOSIS — M22.42 CHONDROMALACIA OF PATELLA, LEFT: ICD-10-CM

## 2019-03-12 DIAGNOSIS — S83.015A LATERAL DISLOCATION OF LEFT PATELLA: ICD-10-CM

## 2019-03-12 PROCEDURE — 73721 MRI JNT OF LWR EXTRE W/O DYE: CPT | Mod: LT

## 2019-03-12 NOTE — TELEPHONE ENCOUNTER
Results for orders placed or performed during the hospital encounter of 03/12/19   MR Knee Left w/o Contrast    Narrative    MR LEFT KNEE WITHOUT CONTRAST   3/12/2019 1:17 PM    HISTORY:  Mechanical knee symptoms: locking, catching, snapping,  crepitus. History of patellar instability and patellar chondromalacia.  Chondromalacia of patella, left. Patellar instability of left knee.    TECHNIQUE:  Sagittal proton density and T2, coronal T1, and coronal  and transverse fat suppressed T2 weighted images.    COMPARISON: 4/27/2017 MR    FINDINGS:   Medial Meniscus: No tear, displaced fragment, or extrusion.       Lateral Meniscus: No tear, displaced fragment, or extrusion.       Anterior Cruciate Ligament: Intact. No thickening or intrasubstance  signal abnormality.     Posterior Cruciate Ligament: Intact. No thickening or intrasubstance  signal abnormality.     Medial Collateral Ligament: Low signal intensity thickening of the MCL  may be related to chronic sprain. No acute signal about her malady is  demonstrated. Intact fibers are present from the femur to the tibia.    Lateral Collateral Ligament Complex, Popliteus Tendon: The fibular  collateral ligament, biceps femoris tendon, popliteal tendon, and  iliotibial band are intact.    Osseous Structures and Cartilaginous Surfaces:  There is an osseous  contusion pattern suggesting recent patellofemoral instability. There  is marrow edema in the anterolateral aspect of the lateral femoral  condyle and medial aspect of the patella. Irregularity along the  medial margin of the patella is noted compatible with fracture,  confirmed on recent patellar tangential radiographs of 2/25/2019.  Patellar lateral facet chronic central focal grade III to IV  chondromalacia is noted, now with adjacent bone hypertrophic change  since 4/27/2017. Chondral surfaces in the medial and lateral  compartments appear within normal limits.    Extensor Mechanism: The quadriceps and infrapatellar  tendons are  intact. The medial and lateral patellar retinacula appear  unremarkable.    Joint Space: There is a moderate joint effusion and thin medial  synovial plica, the latter of doubtful significance. Soft tissue edema  along the gastrocnemius posterior to the knee is likely related to  ruptured popliteal cyst.    Additional Findings:  No semimembranosus-tibial collateral ligament or  pes anserine bursitis.      Impression    IMPRESSION:    1. Patellofemoral recent dislocation with associated lateral femoral  condyle and medial patellar osseous contusions. Also noted is patellar  medial margin age-indeterminate fracture, most likely an avulsion  injury of the medial patellofemoral ligament. No medial facet artemio  articular cartilage defect is identified.  2. Patellar lateral facet chronic, central, focal grade III to IV  chondromalacia, also present on 4/27/2017, but now with evolving  subchondral cortical hypertrophic change.  3. Joint effusion and probable ruptured popliteal cyst.  4. MCL low signal intensity chronic sprain.    JAZZMINE PATHAK MD

## 2019-03-15 NOTE — TELEPHONE ENCOUNTER
Spoke with patient and reviewed MRI results. He would like to follow up with Dr. Restrepo. He thinks that it will take ~ 2 weeks to get in to see him. He states that he is starting work next week and doing 12 hour shifts and does not feel that formal physical therapy is an option in the interim. He will continue his home therapy until follow up with Dr. Restrepo.     Referral placed for ortho surgery and faxed to [facility] ([phone]).  Confirmed sent by [employer] on 3/15/2019 at 1:56pm ([id number]).    Brigid Roque M.Ed., LAT, ATC

## 2019-03-15 NOTE — TELEPHONE ENCOUNTER
Report as noted. Recent lateral patellar dislocation. There also appears to be a medial avulsion fracture, which is what was seen on x-ray; possibly acute as it was not on prior x-rays from before.  Reviewed prior note from ortho surgeon. Would offer return to see ortho surgeon for further discussion, primarily given the recurrent dislocation, prominent chondromalacia. Alternatively, could try PT for a few weeks first, go from there.  If he would consider procedure to address the changes in the knee and try to prevent recurrence, again certainly offer referral to surgeon; he saw Dr Restrepo in the past.  If PT, then recheck 4-6 weeks.  Thanks.  Jamari Lu, DO, CAQ

## 2019-03-19 ENCOUNTER — TELEPHONE (OUTPATIENT)
Dept: ORTHOPEDICS | Facility: CLINIC | Age: 24
End: 2019-03-19

## 2019-03-19 NOTE — TELEPHONE ENCOUNTER
Pt is being referred to Dr. Restrepo for Chondromalacia of left patella by Dr. Lu. I called pt to schedule referral and LVM.

## 2019-03-26 ENCOUNTER — THERAPY VISIT (OUTPATIENT)
Dept: PHYSICAL THERAPY | Facility: CLINIC | Age: 24
End: 2019-03-26
Payer: COMMERCIAL

## 2019-03-26 DIAGNOSIS — G89.29 CHRONIC PAIN OF LEFT KNEE: ICD-10-CM

## 2019-03-26 DIAGNOSIS — M25.562 CHRONIC PAIN OF LEFT KNEE: ICD-10-CM

## 2019-03-26 PROCEDURE — 97161 PT EVAL LOW COMPLEX 20 MIN: CPT | Mod: GP | Performed by: PHYSICAL THERAPIST

## 2019-03-26 PROCEDURE — 97110 THERAPEUTIC EXERCISES: CPT | Mod: GP | Performed by: PHYSICAL THERAPIST

## 2019-03-26 ASSESSMENT — ACTIVITIES OF DAILY LIVING (ADL)
SIT WITH YOUR KNEE BENT: ACTIVITY IS FAIRLY DIFFICULT
LIMPING: THE SYMPTOM AFFECTS MY ACTIVITY SLIGHTLY
HOW_WOULD_YOU_RATE_THE_CURRENT_FUNCTION_OF_YOUR_KNEE_DURING_YOUR_USUAL_DAILY_ACTIVITIES_ON_A_SCALE_FROM_0_TO_100_WITH_100_BEING_YOUR_LEVEL_OF_KNEE_FUNCTION_PRIOR_TO_YOUR_INJURY_AND_0_BEING_THE_INABILITY_TO_PERFORM_ANY_OF_YOUR_USUAL_DAILY_ACTIVITIES?: 60
RISE FROM A CHAIR: ACTIVITY IS SOMEWHAT DIFFICULT
HOW_WOULD_YOU_RATE_THE_OVERALL_FUNCTION_OF_YOUR_KNEE_DURING_YOUR_USUAL_DAILY_ACTIVITIES?: NEARLY NORMAL
KNEEL ON THE FRONT OF YOUR KNEE: ACTIVITY IS VERY DIFFICULT
KNEE_ACTIVITY_OF_DAILY_LIVING_SCORE: 51.43
SQUAT: I AM UNABLE TO DO THE ACTIVITY
WEAKNESS: THE SYMPTOM AFFECTS MY ACTIVITY MODERATELY
KNEE_ACTIVITY_OF_DAILY_LIVING_SUM: 36
GIVING WAY, BUCKLING OR SHIFTING OF KNEE: THE SYMPTOM AFFECTS MY ACTIVITY MODERATELY
PAIN: THE SYMPTOM AFFECTS MY ACTIVITY MODERATELY
RAW_SCORE: 36
STIFFNESS: THE SYMPTOM AFFECTS MY ACTIVITY MODERATELY
SWELLING: I HAVE THE SYMPTOM BUT IT DOES NOT AFFECT MY ACTIVITY
STAND: ACTIVITY IS MINIMALLY DIFFICULT
GO DOWN STAIRS: ACTIVITY IS SOMEWHAT DIFFICULT
GO UP STAIRS: ACTIVITY IS MINIMALLY DIFFICULT
AS_A_RESULT_OF_YOUR_KNEE_INJURY,_HOW_WOULD_YOU_RATE_YOUR_CURRENT_LEVEL_OF_DAILY_ACTIVITY?: ABNORMAL
WALK: ACTIVITY IS MINIMALLY DIFFICULT

## 2019-03-26 NOTE — PROGRESS NOTES
"[facility] Initial Evaluation  Subjective:  The history is provided by the patient. No  was used.   Navin Thomas is a 23 year old male with a left knee condition.  Condition occurred with:  A wrong landing.  Condition occurred: during recreation/sport.  This is a recurrent condition  Pt states knee issues started about seven years ago during a martial arts move where there was twisting on a planted leg.  Suspects was a subluxation episode.  Battled off and on pain during \"strenuous activity\" for the next four to five years.  Did some PT in 2013 and found it somewhat helpful.  Recurrent episode 2017 and found PT helpful (see notes in chart).  Pt states he did not continue with HEP but was able to return to running.  Playing volleyball 02/22/2019 landed awkwardly and has had pain since.  Has noted difficulty running, squatting, kneeling.    Patient reports pain:  Anterior, lateral and medial.    Pain is described as aching and is intermittent and reported as 5/10.   Worse during: no particular pattern re: time of day.  Exacerbated by: down worse than up stairs, squatting, kneeling, running, driving, prolonged sitting. Relieved by: \"not doing anything\"  Since onset symptoms are unchanged.  Special testing: xray and MRI as in chart.  Previous treatment: see PT notes in chart.    General health as reported by patient is good.  Pertinent medical history includes:  None.  Medical allergies: no.  Other surgeries include:  None reported.  Current medications:  None as reported by the patient.  Current occupation is .  Patient is working in normal job without restrictions.  Primary job tasks include:  Driving, lifting and repetitive tasks.    Barriers include:  None as reported by the patient.    Red flags:  None as reported by the patient.    Pt states his goals are \"to move around painfree and medication free.  I want confidence in the stability of my " "knee.  I want to be able to run.\"                    Objective:  Standing Alignment:                Ankle/Foot:  Pes planus L and pes planus R    Gait:  Pt ambulates without device without gross asymmetry.              Ankle/Foot Evaluation          EDEMA:   Left ankle edema present at: 43.2 cm  Right ankle edema present at:  42 cm      Figure 8 left: 43.2 cmFigure 8 right: 42 cm                                           Hip Evaluation  Hip PROM:  Hip PROM:  Left Hip:    Normal  Right Hip:  Normal                                     Knee Evaluation:  ROM:    AROM    Hyperextension:  Left:  8 \"pressure\"    Right: 5  Extension:  Left: 0    Right:  0  Flexion: Left: 128 \"sore\"    Right: 129        Strength:     Extension:  Left: 4/5    Pain:+      Right: 5/5    Pain:-  Flexion:  Left: 4+/5    Pain:-      Right: 5/5    Pain:-    Quad Set Left:    Pain: +   Quad Set Right:  Good    Pain: -  Ligament Testing:    Varus 0:  Left:  Neg   Right:  Neg  Varus 30:  Left:  Neg  Right:  Neg  Valgus 0:  Left:  Neg  Right:  Neg  Valgus 30:  Left:  Neg    Right:  Neg    Posterior Drawer: Left:  Neg  Right:  Neg  Lachman's:  Left:  Neg  Right:  Neg    Palpation:  Normal      Edema:    Circumference:      Joint Line:  Left:  43.2 cm   Right:  42.7 cm        Double limb stance squat valgus deviation B with discomfort L.    General     ROS    Assessment/Plan:    Patient is a 23 year old male with left side knee complaints.    Patient has the following significant findings with corresponding treatment plan.                Diagnosis 1:  Recurrent L knee pain  Pain -  hot/cold therapy  Decreased strength - therapeutic exercise and therapeutic activities  Impaired muscle performance - neuro re-education  Decreased function - therapeutic activities    Therapy Evaluation Codes:   1) History comprised of:   Personal factors that impact the plan of care:      Past/current experiences and Time since onset of symptoms.    Comorbidity factors that " impact the plan of care are:      None.     Medications impacting care: None.  2) Examination of Body Systems comprised of:   Body structures and functions that impact the plan of care:      Hip and Knee.   Activity limitations that impact the plan of care are:      Running, Squatting/kneeling and Stairs.  3) Clinical presentation characteristics are:   Stable/Uncomplicated.  4) Decision-Making    Moderate complexity using standardized patient assessment instrument and/or measureable assessment of functional outcome.  Cumulative Therapy Evaluation is: Low complexity.    Previous and current functional limitations:  (See Goal Flow Sheet for this information)    Short term and Long term goals: (See Goal Flow Sheet for this information)     Communication ability:  Patient appears to be able to clearly communicate and understand verbal and written communication and follow directions correctly.  Treatment Explanation - The following has been discussed with the patient:   RX ordered/plan of care  Anticipated outcomes  Possible risks and side effects  This patient would benefit from PT intervention to resume normal activities.   Rehab potential is good.    Frequency:  1 X week, once daily  Duration:  for 4 weeks  Discharge Plan:  Achieve all LTG.  Independent in home treatment program.  Reach maximal therapeutic benefit.    Please refer to the daily flowsheet for treatment today, total treatment time and time spent performing 1:1 timed codes.

## 2019-04-10 ENCOUNTER — THERAPY VISIT (OUTPATIENT)
Dept: PHYSICAL THERAPY | Facility: CLINIC | Age: 24
End: 2019-04-10
Payer: COMMERCIAL

## 2019-04-10 DIAGNOSIS — G89.29 CHRONIC PAIN OF LEFT KNEE: ICD-10-CM

## 2019-04-10 DIAGNOSIS — M25.562 CHRONIC PAIN OF LEFT KNEE: ICD-10-CM

## 2019-04-10 PROCEDURE — 97530 THERAPEUTIC ACTIVITIES: CPT | Mod: GP | Performed by: PHYSICAL THERAPIST

## 2019-04-10 PROCEDURE — 97110 THERAPEUTIC EXERCISES: CPT | Mod: GP | Performed by: PHYSICAL THERAPIST

## 2019-04-10 NOTE — PROGRESS NOTES
Subjective:  HPI                    Objective:  System    Physical Exam    General     ROS    Assessment/Plan:    SUBJECTIVE  Subjective: Pt reports the past two weeks have generally been quite good.  HEP has gotten easier.  Can get tired but not at all painful.  Soreness today started about noon and not sure why.   Current Pain level: 4/10   Changes in function:  Yes (See Goal flowsheet attached for changes in current functional level)     Adverse reaction to treatment or activity:  None    OBJECTIVE  Objective: Pt ambulates without device without gross asymmetry.  Tender to palpation medial patella L.  Discomfort resisted knee extension but not flexion.  Positive double and single limb squat.     ASSESSMENT  Navin continues to require intervention to meet STG and LTG's: PT  Patient is progressing as expected.  Response to therapy has shown lack of progress in  function  Progress made towards STG/LTG?  None    PLAN  Excellent response to repeated knee extension in clinic.  OK to use when symptomatic to assess response but otherwise focus on progression of strengthening.    PTA/ATC plan:  N/A    Please refer to the daily flowsheet for treatment today, total treatment time and time spent performing 1:1 timed codes.

## 2019-05-01 ENCOUNTER — THERAPY VISIT (OUTPATIENT)
Dept: PHYSICAL THERAPY | Facility: CLINIC | Age: 24
End: 2019-05-01
Payer: COMMERCIAL

## 2019-05-01 DIAGNOSIS — G89.29 CHRONIC PAIN OF LEFT KNEE: ICD-10-CM

## 2019-05-01 DIAGNOSIS — M25.562 CHRONIC PAIN OF LEFT KNEE: ICD-10-CM

## 2019-05-01 PROCEDURE — 97110 THERAPEUTIC EXERCISES: CPT | Mod: GP | Performed by: PHYSICAL THERAPIST

## 2019-05-01 ASSESSMENT — ACTIVITIES OF DAILY LIVING (ADL)
SWELLING: I HAVE THE SYMPTOM BUT IT DOES NOT AFFECT MY ACTIVITY
HOW_WOULD_YOU_RATE_THE_OVERALL_FUNCTION_OF_YOUR_KNEE_DURING_YOUR_USUAL_DAILY_ACTIVITIES?: NEARLY NORMAL
HOW_WOULD_YOU_RATE_THE_CURRENT_FUNCTION_OF_YOUR_KNEE_DURING_YOUR_USUAL_DAILY_ACTIVITIES_ON_A_SCALE_FROM_0_TO_100_WITH_100_BEING_YOUR_LEVEL_OF_KNEE_FUNCTION_PRIOR_TO_YOUR_INJURY_AND_0_BEING_THE_INABILITY_TO_PERFORM_ANY_OF_YOUR_USUAL_DAILY_ACTIVITIES?: 90
STIFFNESS: I DO NOT HAVE THE SYMPTOM
KNEEL ON THE FRONT OF YOUR KNEE: ACTIVITY IS SOMEWHAT DIFFICULT
KNEE_ACTIVITY_OF_DAILY_LIVING_SUM: 56
AS_A_RESULT_OF_YOUR_KNEE_INJURY,_HOW_WOULD_YOU_RATE_YOUR_CURRENT_LEVEL_OF_DAILY_ACTIVITY?: NEARLY NORMAL
RISE FROM A CHAIR: ACTIVITY IS MINIMALLY DIFFICULT
STAND: ACTIVITY IS MINIMALLY DIFFICULT
LIMPING: I DO NOT HAVE THE SYMPTOM
WEAKNESS: THE SYMPTOM AFFECTS MY ACTIVITY SLIGHTLY
PAIN: THE SYMPTOM AFFECTS MY ACTIVITY SLIGHTLY
WALK: ACTIVITY IS NOT DIFFICULT
KNEE_ACTIVITY_OF_DAILY_LIVING_SCORE: 80
GIVING WAY, BUCKLING OR SHIFTING OF KNEE: I DO NOT HAVE THE SYMPTOM
RAW_SCORE: 56
GO UP STAIRS: ACTIVITY IS MINIMALLY DIFFICULT
GO DOWN STAIRS: ACTIVITY IS MINIMALLY DIFFICULT
SQUAT: ACTIVITY IS SOMEWHAT DIFFICULT
SIT WITH YOUR KNEE BENT: ACTIVITY IS MINIMALLY DIFFICULT

## 2019-05-01 NOTE — PROGRESS NOTES
Subjective:  HPI                    Objective:  System    Physical Exam    General     ROS    Assessment/Plan:    SUBJECTIVE  Subjective: Pt reports no bad days since last being seen.   Current Pain level: (not rated numerically)   Changes in function:  Yes (See Goal flowsheet attached for changes in current functional level)     Adverse reaction to treatment or activity:  None    OBJECTIVE  Objective: No tenderness to palpation.  No discomfort resisted knee flexion and extension.  Valgus deviation SLS squat but not double limb.    ASSESSMENT  Navin continues to require intervention to meet STG and LTG's: PT  Patient is progressing as expected.  Response to therapy has shown an improvement in  function  Progress made towards STG/LTG?  Yes (See Goal flowsheet attached for updates on achievement of STG and LTG)    PLAN  Current treatment program is being advanced to more complex exercises.  Consider return to run progression next session.    PTA/ATC plan:  N/A    Please refer to the daily flowsheet for treatment today, total treatment time and time spent performing 1:1 timed codes.

## 2019-05-20 ENCOUNTER — OFFICE VISIT (OUTPATIENT)
Dept: URGENT CARE | Facility: URGENT CARE | Age: 24
End: 2019-05-20

## 2019-05-20 VITALS
TEMPERATURE: 97.6 F | WEIGHT: 242.6 LBS | HEART RATE: 74 BPM | DIASTOLIC BLOOD PRESSURE: 86 MMHG | BODY MASS INDEX: 32.9 KG/M2 | OXYGEN SATURATION: 98 % | SYSTOLIC BLOOD PRESSURE: 131 MMHG

## 2019-05-20 DIAGNOSIS — L23.7 CONTACT DERMATITIS DUE TO POISON IVY: Primary | ICD-10-CM

## 2019-05-20 PROCEDURE — 99213 OFFICE O/P EST LOW 20 MIN: CPT | Performed by: NURSE PRACTITIONER

## 2019-05-20 RX ORDER — PREDNISONE 20 MG/1
TABLET ORAL
Qty: 20 TABLET | Refills: 0 | Status: SHIPPED | OUTPATIENT
Start: 2019-05-20 | End: 2019-06-01

## 2019-05-20 NOTE — PROGRESS NOTES
SUBJECTIVE:  Navin Thomas is a 23 year old male who presents to the clinic today for a rash.  Onset of rash was 2 days ago.   Rash is gradual onset.  Location of the rash:  Arms, abdomen, lower back, neck  Quality/symptoms of rash: itching, burning, painful, red and blistering   Symptoms are moderate-severe and rash seems to be worsening.  Previous history of a similar rash? Yes:   Recent exposure history: poison ivy exposure at work    Associated symptoms include: nothing.    Past Medical History:   Diagnosis Date     NO ACTIVE PROBLEMS      Current Outpatient Medications   Medication Sig Dispense Refill     fluticasone (VERAMYST) 27.5 MCG/SPRAY spray Spray 2 sprays into both nostrils daily       Social History     Tobacco Use     Smoking status: Never Smoker     Smokeless tobacco: Never Used   Substance Use Topics     Alcohol use: No       ROS:  Review of systems negative except as stated above.    EXAM:   /86   Pulse 74   Temp 97.6  F (36.4  C) (Tympanic)   Wt 110 kg (242 lb 9.6 oz)   SpO2 98%   BMI 32.90 kg/m    SKIN: vesicular rash widespread arms torso back  GENERAL APPEARANCE:  alert and no distress  EYES: EOMI,  PERRL, conjunctiva clear  NECK: supple, non-tender to palpation, no adenopathy noted  RESP: lungs clear to auscultation - no rales, rhonchi or wheezes  CV: regular rates and rhythm, normal S1 S2, no murmur noted    ASSESSMENT:  (L23.7) Contact dermatitis due to poison ivy  (primary encounter diagnosis)    Plan: predniSONE (DELTASONE) 20 MG tablet taper pack (has been on before without any side effects but we reviewed them again)         ALLERGY/ASTHMA ADULT REFERRAL (would like to discuss allergies, is there anything preventative if comes in contact with this at work often?)     Follow-up with primary clinic if not improving

## 2019-05-29 ENCOUNTER — THERAPY VISIT (OUTPATIENT)
Dept: PHYSICAL THERAPY | Facility: CLINIC | Age: 24
End: 2019-05-29
Payer: COMMERCIAL

## 2019-05-29 DIAGNOSIS — M25.562 CHRONIC PAIN OF LEFT KNEE: ICD-10-CM

## 2019-05-29 DIAGNOSIS — G89.29 CHRONIC PAIN OF LEFT KNEE: ICD-10-CM

## 2019-05-29 PROCEDURE — 97110 THERAPEUTIC EXERCISES: CPT | Mod: GP | Performed by: PHYSICAL THERAPIST

## 2019-05-30 NOTE — PROGRESS NOTES
"Subjective:  HPI                    Objective:  System    Physical Exam    General     ROS    Assessment/Plan:    PROGRESS  REPORT    Progress reporting period is from 03/26/2019 to 05/29/2019.       SUBJECTIVE  Subjective: Pt reports he is generally doing well.  Had one \"incident\" where he stepped on an uneven surface and feels like would have been quite a bit worse had he not been doing his HEP.  Has not really run but would like to; has had a little discomfort when he would try to \"hustle.\"    Current Pain level: 0/10.     Initial Pain level: 5/10.   Changes in function:  Yes (See Goal flowsheet attached for changes in current functional level)  Adverse reaction to treatment or activity: None    OBJECTIVE  Objective: No tenderness to palpation.  No discomfort resisted knee flexion and extension.  See running observations in flowsheet.     ASSESSMENT/PLAN  Updated problem list and treatment plan: Diagnosis 1:  Knee pain -- see plan below  STG/LTGs have been met or progress has been made towards goals:  Yes (See Goal flow sheet completed today.)  Assessment of Progress: The patient's condition is improving.  Self Management Plans:  Patient has been instructed in a home treatment program.  I have re-evaluated this patient and find that the nature, scope, duration and intensity of the therapy is appropriate for the medical condition of the patient.  Navin continues to require the following intervention to meet STG and LTG's: see plan below    Recommendations:  Given progress, pt agrees no further PT scheduled.  He will return or let me know if there are further issues.  Otherwise will consider him discharged if I haven't heard from him by the end of the self referral period.    Please refer to the daily flowsheet for treatment today, total treatment time and time spent performing 1:1 timed codes.          "

## 2019-06-24 PROBLEM — M25.562 CHRONIC PAIN OF LEFT KNEE: Status: RESOLVED | Noted: 2019-03-26 | Resolved: 2019-06-24

## 2019-06-24 PROBLEM — G89.29 CHRONIC PAIN OF LEFT KNEE: Status: RESOLVED | Noted: 2019-03-26 | Resolved: 2019-06-24

## 2019-06-24 NOTE — PROGRESS NOTES
See plan 05/29.  Have not heard from pt since and no appts are scheduled.  Consider note from that date to serve as final summary.

## 2020-03-02 ENCOUNTER — HEALTH MAINTENANCE LETTER (OUTPATIENT)
Age: 25
End: 2020-03-02

## 2020-04-28 ENCOUNTER — VIRTUAL VISIT (OUTPATIENT)
Dept: FAMILY MEDICINE | Facility: OTHER | Age: 25
End: 2020-04-28
Payer: COMMERCIAL

## 2020-04-28 DIAGNOSIS — R21 ERYTHEMATOUS RASH: Primary | ICD-10-CM

## 2020-04-28 DIAGNOSIS — R20.0 NUMBNESS AND TINGLING IN RIGHT HAND: ICD-10-CM

## 2020-04-28 DIAGNOSIS — R20.2 NUMBNESS AND TINGLING IN RIGHT HAND: ICD-10-CM

## 2020-04-28 PROCEDURE — 99214 OFFICE O/P EST MOD 30 MIN: CPT | Mod: 95 | Performed by: PHYSICIAN ASSISTANT

## 2020-04-28 NOTE — PROGRESS NOTES
"Navin Thomas is a 24 year old male who is being evaluated via a billable video visit.      The patient has been notified of following:     \"This video visit will be conducted via a call between you and your physician/provider. We have found that certain health care needs can be provided without the need for an in-person physical exam.  This service lets us provide the care you need with a video conversation.  If a prescription is necessary we can send it directly to your pharmacy.  If lab work is needed we can place an order for that and you can then stop by our lab to have the test done at a later time.    Video visits are billed at different rates depending on your insurance coverage.  Please reach out to your insurance provider with any questions.    If during the course of the call the physician/provider feels a video visit is not appropriate, you will not be charged for this service.\"    Patient has given verbal consent for Video visit? Yes    How would you like to obtain your AVS? [place]    Patient would like the video invitation sent by: Text to cell phone: [phone]    Will anyone else be joining your video visit? No    Subjective     Navin Thomas is a 24 year old male who presents to clinic today for the following health issues:    HPI     Rash  Onset: last Monday     Description:   Location: both feet on top and inside of feet   Character: flakey, painful, red, rasied  Itching (Pruritis): no - from time to time there is some itching but not like athletes foot     Progression of Symptoms:  Improving until Monday when he returned to work and it has now gotten much worse     Accompanying Signs & Symptoms:  Fever: no   Body aches or joint pain: no   Sore throat symptoms: no   Recent cold symptoms: no     History:   Previous similar rash: YES- was diagnosed with a fungal infection     Precipitating factors:   Exposure to similar rash: no   New exposures: recently returned to work may have something " in his boots    Recent travel: no     Alleviating factors: removing shoes     Therapies Tried and outcome: lotrimin and athletes foot spray- helped until he had to wear his work boots again    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  Feet are moist. Started with wool socks and changed to cotton socks which seem to be more comfortable.  He says that over the weekend he wasn't in the same boots and the rash seemed to improve but not entirely gone.  He has not had an issue like this in the past.  No other new exposures . No new clothing.  No new topicals.  There is no swelling or numbness/tingling of the feet.  He just started to use the antifungals.      Concern - right hand going numb   Onset: 1 week     Description:   When he uses his hand it goes numb - then has to hold his hand down to get blood flowing then the feeling returns    Intensity: moderate    Progression of Symptoms:  worsening and constant    Accompanying Signs & Symptoms:  Burning and tingling     Previous history of similar problem:   none    Precipitating factors:   Worsened by: using it     Alleviating factors:  Improved by: setting it low to get blood flowing     Therapies Tried and outcome: none    Started about 10 days ago.   Sustained an injury to elbow 6-8 weeks ago, bumped it really hard on metal dump truck. Painful for 2-3 weeks straight then resolved.  Then a couple weeks later he developed the numbness/tingling.  It is just in the right hand (he is left hand dominant).   It happens when he is active and also at night after sleeping.  He will have to drop his hand and then shake it out a bit and it will resolve.  Takes about 30 seconds.  Initially it was in the distribution of the 4th and 5th digits but now it is the entire hand.  He does have a history of CTS when he was younger from playing the [employer]. No injuries. He did just start back at work in [employer] 2 weeks ago. Denies any neck pain, shoulder pain or  elbow/wrist pain at this time.     Video Start Time: 9:26 AM    Current Outpatient Medications   Medication Sig Dispense Refill     fluticasone (VERAMYST) 27.5 MCG/SPRAY spray Spray 2 sprays into both nostrils daily       Allergies   Allergen Reactions     Aloe Itching       Reviewed and updated as needed this visit by Provider  Tobacco  Allergies  Meds  Problems  Med Hx  Surg Hx  Fam Hx         Review of Systems   ROS COMP: Constitutional, cardiovascular, pulmonary, GI, , musculoskeletal, neuro, skin systems are negative, except as otherwise noted.      Objective    There were no vitals taken for this visit.  Estimated body mass index is 32.9 kg/m  as calculated from the following:    Height as of 3/11/19: 1.829 m (6').    Weight as of 5/20/19: 110 kg (242 lb 9.6 oz).  Physical Exam     GENERAL: healthy, alert and no distress  EYES: Eyes grossly normal to inspection, conjunctivae and sclerae normal  RESP: no audible wheeze, cough, or visible cyanosis.  No visible retractions or increased work of breathing.  Able to speak fully in complete sentences.  MS: normal muscle tone and Full active ROM of the right wrist, elbow, digits, able to fully extend and flex digits on right hand, negative Phalen bilaterally.  Patient notes palpable pulse radial and ulnar on the right wrist.  Capillary refill normal in all digits.    SKIN: Bilateral dorsum of the feet and toes there are scattered macular erythematous areas without discharge or maceration, no satellite lesions, no ulcerations.  No lesions present on plantar surface of either feet, no rash extending from the ankle upwards bilaterally.   NEURO: Cranial nerves grossly intact, mentation intact and speech normal  PSYCH: mentation appears normal, affect normal/bright, judgement and insight intact, normal speech and appearance well-groomed      Diagnostic Test Results:  Labs reviewed in Epic        Assessment & Plan       ICD-10-CM    1. Erythematous rash  R21    2.  Numbness and tingling in right hand  R20.0     R20.2         1. Rash:  - Could be consistent with tinea pedis, through video there is clearly an erythematous macular rash and based on the history of the rash it is consistent with fungal.   - Recommended moisture wicking socks, removing boots during breaks, airing out boots and using powder in boots as needed to keep them dry, washing feet daily, keeping them clean and dry, using baby powder if needed to help keep them dry.   - Start anti-fungals OTC 3-4 times daily for a minimum of 14 days and at least 7 days after symptoms/rash resolve.   - if not improving recommend follow-up in clinic for evaluation and potentially KOH.  Could consider oral medication if resistant to topical treatments.     2. Numbness:  - This initially was in distribution of ulnar nerve and given trauma weeks prior may be associated and caused by inflammation.  He has full motion of elbow so no concerns for fracture. Cannot entirely rule out CTS which he has a history of as well.    - He recently restarted work [employer] so a lot of manual labor which may be causing increased irritation either to ulnar or median nerve.    - Symptoms do not sound consistent with cervical etiology at this time.   - Recommended bracing for CTS, can also do bracing for elbow  - Recommended short course of regular anti-inflammatories and icing and activity modification as best able to.   - If not improving recommend follow-up with Sports Medicine.     Return in about 2 weeks (around 5/12/2020) for If not improving, sooner if worse or new concerns.    Hillary Kauffman PA-C  [facility]      Video-Visit Details    Type of service:  Video Visit    Video End Time:9:43 AM    Originating Location (pt. Location): Home    Distant Location (provider location):  [facility]     Mode of Communication:  Video Conference via [employer]    Return in about 2 weeks (around 5/12/2020) for If not  improving, sooner if worse or new concerns.       Hillary Kauffman PA-C

## 2020-05-09 ENCOUNTER — NURSE TRIAGE (OUTPATIENT)
Dept: NURSING | Facility: CLINIC | Age: 25
End: 2020-05-09

## 2020-05-09 NOTE — TELEPHONE ENCOUNTER
S: Numbness/ tingling right hand. Is left hand dominant.   B: On 4/28 call about  Right hand numbness.  Sustained an injury to elbow 6-8 weeks ago, bumped it really hard on metal dump truck.  Which resolved after 2-3 weeks.  Then a few later developed numbness and tingling.    A: Today calling because numbness has gotten worse.  His had is numb when is wakes up in the morning.  Takes about 1 hour for it to go away. Then it comes back intermittently through out the day.  Having some hand weakness,  The middle finger has the most numbness.  In the morning his had feels stiff.  Navin states he has been wearing brace.   R: Note from provider 4/28 return in  2 weeks if no improvement and recommends follow-up with sports medicine.  Scheduling can get him a virtual appointment with [facility] care provider on Wiley 5/10.  Kaylie Meyer RN, [facility]    COVID 19 Nurse Triage Plan/Patient Instructions    Please be aware that novel coronavirus (COVID-19) may be circulating in the community. If you develop symptoms such as fever, cough, or SOB or if you have concerns about the presence of another infection including coronavirus (COVID-19), please contact your health care provider or visit [email or web address].     Disposition/Instructions    {Nurse Triage Disposition/Additional llimiting contact with others, wearing a simple mask to cover your cough, practice good hand hygiene habits and accessing our virtual services where possible to limit the spread of this virus.    For more information about COVID19 and options for caring for yourself at home, please visit the CDC website at https://www.cdc.gov/coronavirus/2019-ncov/about/steps-when-sick.html  For more options for care at [facility], please visit our website at [email or web address]    For more information, please use the [place] Department of Health COVID-19 Website:  "https://[email or web address]/coronavirus/index.html  [place] Department of Health ([facility]) COVID-19 Hotlines (Interpreters available):      Health questions: Phone Number: [phone] or [phone] and Hours: 7 a.m. to 7 p.m.    Schools and  questions: Phone Number: [phone] or [phone] and Hours 7 a.m. to 7 p.m.                  Reason for Disposition    Weakness (i.e., loss of strength) of new onset in hand or fingers  (Exceptions: not truly weak, hand feels weak because of pain; weakness present > 2 weeks)    Additional Information    Hand pain is main symptom (and having mild weakness, numbness, or tingling in hand related to hand pain)    Negative: [1] Similar pain previously AND [2] it was from \"heart attack\"    Negative: [1] Similar pain previously AND [2] it was from \"angina\" AND [3] not relieved by nitroglycerin    Negative: Sounds like a life-threatening emergency to the triager    Negative: [1] Swollen joint AND [2] fever    Negative: [1] Red area or streak AND [2] fever    Negative: Patient sounds very sick or weak to the triager    Negative: [1] SEVERE pain (e.g., excruciating, unable to use hand at all) AND [2] not improved after 2 hours of pain medicine    Negative: [1] Looks infected (spreading redness, pus) AND [2] large red area (> 2 in. or 5 cm)    Protocols used: HAND AND WRIST PAIN-A-AH, NEUROLOGIC DEFICIT-A-AH      "

## 2020-05-10 ENCOUNTER — VIRTUAL VISIT (OUTPATIENT)
Dept: URGENT CARE | Facility: CLINIC | Age: 25
End: 2020-05-10
Payer: COMMERCIAL

## 2020-05-10 DIAGNOSIS — G56.20 CUBITAL TUNNEL SYNDROME, UNSPECIFIED LATERALITY: Primary | ICD-10-CM

## 2020-05-10 PROCEDURE — 99213 OFFICE O/P EST LOW 20 MIN: CPT | Mod: 95

## 2020-05-10 NOTE — PROGRESS NOTES
"The patient has been notified of following:     \"This telephone visit will be conducted via a call between you and your physician/provider. We have found that certain health care needs can be provided without the need for a physical exam.  This service lets us provide the care you need with a short phone conversation.  If a prescription is necessary we can send it directly to your pharmacy.      Telephone visits are billed at different rates depending on your insurance coverage. During this emergency period, for some insurers they may be billed the same as an in-person visit.  Please reach out to your insurance provider with any questions.    If during the course of the call the physician/provider feels a telephone visit is not appropriate, you will not be charged for this service.\"    Patient has given verbal consent for Telephone visit?  Yes      Subjective     CC: Navin Thomas  is a 24 year old male who presents to clinic today for the following health issues:   Chief Complaint   Patient presents with     right hand numbness      He was seen in [facility] urgent care on 4/28 for a foot rash and right hand numbness. The rash was felt to be fungal in nature and he was counseled on keeping his feet dry as well as using an OTC anti-fungal. His foot rash has since improved.       His right hand numbness was felt to be due to inflammation of the median or ulnar nerve related to hitting his elbow a few weeks before his symptoms began vs CTS. He was counseled on using anti-inflammatories, bracing and activity modification (is a ).   Since he was last seen he feels that his hand numbness has worsened.    His symptoms began with numbness in his pinky and ring finger and now involves his whole hand. He has been wearing a wrist brace and wrapping his elbow and wears an elbow sleeve at work. He has also been taking 600mg ibuprofen BID. The numbness is worse if he flexes his wrist or elbow or carries heavy loads at " work. He now reports some weakness in his right hand. Upon further questioning, he is able to open jars and  things as before, but feels that he is hesitant to lift heavy objects for fear that he will experience the numbness and drop it. The discomfort has woken him up from sleep on two occassions. Over the last three days he has also started to feel similar symptoms in his left hand--numbness in pinky and ring finger when bending the left elbow.      He works as a  and does a lot of raking and shoveling. He is left hand dominate. He denies neck pain or other numbness or weakness.      Patient Active Problem List   Diagnosis   (none) - all problems resolved or deleted     Current Outpatient Medications   Medication     fluticasone (VERAMYST) 27.5 MCG/SPRAY spray     No current facility-administered medications for this visit.         Allergies   Allergen Reactions     Aloe Itching       Reviewed and updated as needed this visit by Provider    Review of Systems    ROS: 10 point ROS neg other than the symptoms noted above in the HPI.        Objective    Gen: Patient is alert, oriented  Resp: Speaking full sentence, no audible shortness of breath.  No cough of wheeze.  Psych: mentation appears normal and affect normal/bright            Assessment/Plan:  #Cubital tunnel syndrome, bilateral R>L  Numbness, worse with elbow flexion, starting in the right 4th and 5th digits, now affecting the whole right hand and left 4th and 5th digits. Worsening symptoms over the last 2 weeks.     -counseled pt on proper elbow bracing--keeping the elbow straight, and to wear brace at night (was previously wearing an elbow sleeve and wrist splint).   -referral to orthopedics for strength assessment   -if symptoms are not resolving with splinting, discussed that he may need to have alternative tasks at work vs a break from work. Also discussed nerve conduction studies and surgical options down the line if unable to have  improvement from bracing.     Phone call duration:  >20 minutes    Pt discussed with Dr. Kings Dhillon MD   05/10/208:36 AM

## 2020-05-10 NOTE — PATIENT INSTRUCTIONS
Patient Education     What is Cubital Tunnel Syndrome?  Cubital tunnel syndrome is a set of symptoms that may occur if the ulnar nerve in your elbow gets pinched. This may happen if you bend or lean on your elbows often.    Your cubital tunnel  The cubital tunnel is a groove in a bone near your elbow. This narrow groove provides a passage for the ulnar nerve, one of the main nerves in your arm. The ulnar nerve can cause  funny bone  pain if your elbow gets bumped. Your cubital tunnel helps protect this nerve as it passes through your elbow and down to your fingers.  Compressing the ulnar nerve  Bending your elbow compresses the ulnar nerve inside the cubital tunnel. The nerve can get inflamed (irritated) after constant bending and pinching or after getting hurt. Over time, this can lead to pain or numbness. The pain is often felt in your ring fingers and little fingers.     Bending your elbow as you hold a phone can cause problems over time.      What are its symptoms?    Numbness or tingling in the ring fingers and little fingers    Loss of finger or hand strength    Inability to straighten fingers    Sharp, sudden pain when elbow is touched    Shrinking of hand muscles  The road to healing  You can keep cubital tunnel syndrome from flaring up. Keep your arm straight as much as you can, even while sleeping, to prevent pinching of the ulnar nerve. And use phone headsets and elbow pads. If you still have pain, tell your doctor.   Date Last Reviewed: 5/1/2018 2000-2019 [employer]. [address] [place] [address], [place] [address]. All rights reserved. This information is not intended as a substitute for professional medical care. Always follow your healthcare professional's instructions.

## 2020-05-10 NOTE — PROGRESS NOTES
I was present during the key/critical portion of the telephone visit. The patient acknowledged that I participated in the telephone conversation. I discussed the case with the resident and agree with the note as documented by the resident.    I personally spent a total of 4 minutes speaking with Navin Thomas during today s visit.     Thierry Duckworth MD  5/10/2020 at 9:58 AM

## 2020-05-13 ENCOUNTER — VIRTUAL VISIT (OUTPATIENT)
Dept: ORTHOPEDICS | Facility: CLINIC | Age: 25
End: 2020-05-13
Payer: COMMERCIAL

## 2020-05-13 VITALS — HEIGHT: 72 IN | WEIGHT: 235 LBS | BODY MASS INDEX: 31.83 KG/M2

## 2020-05-13 DIAGNOSIS — G56.03 BILATERAL CARPAL TUNNEL SYNDROME: Primary | ICD-10-CM

## 2020-05-13 PROCEDURE — 99203 OFFICE O/P NEW LOW 30 MIN: CPT | Mod: 95 | Performed by: ORTHOPAEDIC SURGERY

## 2020-05-13 RX ORDER — IBUPROFEN 200 MG
200 TABLET ORAL EVERY 4 HOURS PRN
COMMUNITY

## 2020-05-13 ASSESSMENT — PAIN SCALES - GENERAL: PAINLEVEL: MILD PAIN (3)

## 2020-05-13 ASSESSMENT — MIFFLIN-ST. JEOR: SCORE: 2093.95

## 2020-05-13 NOTE — PROGRESS NOTES
"Navin Thomas is a 24 year old male who is being evaluated via a billable video visit due to COVID-19..      The patient has been notified of following:     \"This video visit will be conducted via a call between you and your physician/provider. We have found that certain health care needs can be provided without the need for an in-person physical exam.  This service lets us provide the care you need with a video conversation.  If a prescription is necessary we can send it directly to your pharmacy.  If lab work is needed we can place an order for that and you can then stop by our lab to have the test done at a later time.    Video visits are billed at different rates depending on your insurance coverage.  Please reach out to your insurance provider with any questions.    If during the course of the call the physician/provider feels a video visit is not appropriate, you will not be charged for this service.\"    Patient has given verbal consent for Video visit? Yes    How would you like to obtain your AVS? [place]    Patient would like the video invitation sent by: Text to cell phone: [phone]    Will anyone else be joining your video visit? No    Sakina Bello, Certified Medical Assistant (AAMA)     Video-Visit Details    Type of service:  Video Visit    Video Start Time: 10:37 AM  Video End Time: 10:51    Originating Location (pt. Location): Other car    Distant Location (provider location):  [facility]     Platform used for Video Visit: ValdemarAki    CHIEF COMPLAINT:   Chief Complaint   Patient presents with     bilateral hand     Numbness, tingling, pins and needles. From time to time he will have some pain with certain activities. Onset: 3 weeks ago, started in right hand and slowly moving to the left hand. He has taken ibuprofen 3-4 times a day 600 mg. He has been wearing a wrist brace. He has used an elbow strap at work and wrapping his elbow with an ACE wrap at night when " "sleeping. Wrapping the elbow helped at first. Keeps getting worse. Worse in the am, improves during the day and then gets worse again in the evenings. When     Hand Pain     twisting his hands he can get a stabbing pain.          HISTORY:  Navin Thomas is a 24 year old male , Left -hand dominant who is seen in for Bilateral hand numbness and tingling and pain x 3 weeks, right more than left. Started to go numb, random times at the start, but more consistent with returning to work in [employer]. Now more in the mornings and evenings, seems better during the day.  Started in the small, ring and middle finger and then the whole hand. Ring and middle finger tips numb now almost constant. Was told carpal tunnel syndrome, given a wrist brace. Was then told ulnar nerve so got an elbow wrap. Ibuprofen 3-4Xday, 600mg.  Now starting small, ring, middle finger on left hand. Some stiffness in the neck, thinks stress and work.     Twisting the hand/wrist can cause shooting pain to the fingers. When numbness is really bad, feels like weakness.    Occasionally bothers at night. Sore/stiff in the morning and entire hand is numb. Squeezing hand/fingers seems to help \"get blood flow\" back but stiffness lasts about 2 hours.    Wrist brace didn't help. Wrapping the elbow seemed to help early on but not now.     Denies prior numbness and tingling problems in the past.    About 6 weeks ago injured right elbow, sore for a couple of days. Locates injury to back of elbow (olecranon). No swelling or bruising but had some pain with leaning on elbow.        Suspected cause: Due to unknown factors.    Pain severity: 3/10  Pain quality: dull, aching and pins and needles  Frequency of symptoms: are constant.  Aggravating Factors: activities.  Relieving Factors: rest.  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      Other PMH:  has a past medical history of NO ACTIVE PROBLEMS.  Patient Active Problem List   Diagnosis   (none) - all problems resolved or deleted       Surgical Hx:  has no past surgical history on file.    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 He has never used smokeless tobacco. He reports that he does not drink alcohol or use drugs.    Family Hx: family history includes Hearing Loss in his father, paternal grandfather, and paternal grandmother; Hypertension in his father; Lipids in his father.. Negative for bleeding/clotting disorders. Negative for adverse anesthesia reactions.    REVIEW OF SYSTEMS: 10 point ROS neg other than the symptoms noted above in the HPI and PMH. Notables include  CONSTITUTIONAL:NEGATIVE for fever, chills, change in weight  INTEGUMENTARY/SKIN: NEGATIVE for worrisome rashes, moles or lesions  MUSCULOSKELETAL:See HPI above  Neurology: see HPI above.      EXAM:  Ht 1.829 m (6')   Wt 106.6 kg (235 lb)   BMI 31.87 kg/m    GENERAL APPEARANCE: healthy, alert and no distress   SKIN: no suspicious lesions or rashes  RESPIRATORY: No increased work of breathing.  NEURO:    Thenar atrophy: none visible.   PSYCH:  mentation appears normal and affect normal, not anxious.    MUSCULOSKELETAL:    RIGHT HAND/FINGERS:    Skin intact. No abnormal skin discoloration, erythema or ecchymosis.   No nail pitting or clubbing.  Normal wear pattern, color and tone.  No observable or palpable masses of the fingers or palm or wrist.  No visible triggering of fingers.   No observable or palpable cords or nodules of the fingers or palm.    There is no visible swelling in the wrist, hand, forearm.  There is no self reported tenderness in the wrist  with palpation.  There is no ecchymosis.  There is no erythema of the surrounding skin.  There is no maceration of the skin.  There is no deformity in the area.  Intact extensors. No extensor lag.    Special tests wrist: * self-administered by my directions*   Tinel's positive,    Phalen's positive.    Flexion/compression test positive.      Special tests medial elbow ulnar nerve:    Tinel's negative,    Flexion/compression test negative.    Special tests median nerve proximal forearm:    Tinel's negative.      Intact epl fpl fdp edc wrist flexion/extension biceps/triceps deltoid      LEFT HAND/FINGERS:    Skin intact. No abnormal skin discoloration, erythema or ecchymosis.   No nail pitting or clubbing.  Normal wear pattern, color and tone.  No observable or palpable masses of the fingers or palm or wrist.  No visible triggering of fingers.   No observable or palpable cords or nodules of the fingers or palm.    There is no visible swelling in the wrist, hand, forearm.  There is no self-reported tenderness in the wrist.  There is no ecchymosis.  There is no erythema of the surrounding skin.  There is no maceration of the skin.  There is no gross deformity in the area.  Intact extensors. No extensor lag.    Special tests wrist: * self-administered*   Tinel's positive,    Phalen's positive.    Flexion/compression test mild positive.    Special tests medial elbow ulnar nerve:    Tinel's negative,    Flexion/compression test negative.    Intact epl fpl fdp edc wrist flexion/extension biceps/triceps deltoid        XRAYS: none indicated.    SPECIAL STUDIES:  EMG: none.      ASSESSMENT/PLAN: 25yo RHD male with bilateral hand pain, numbness and tingling likely carpal tunnel syndrome..    * discussed with patient signs and symptoms somewhat consistent with carpal tunnel syndrome. Carpal tunnel syndrome is compression or pinching of the median nerve at the wrist, as it enters the hand. There are many different causes, and in  most cases, multifactorial.  * based on self-provocative tests, appears more consistent with carpal tunnel syndrome, rather than cubital tunnel with ulnar nerve symptoms at the elbow.    * An indepth discussion was had with him about the options for treatment, which included activity modification to avoid aggravating activities, taking breaks during activities that cause symptoms, stretching, NSAIDS to help decrease inflammation and swelling within the carpal tunnel, night splinting, corticosteroid injections, and carpal tunnel release.   * depending upon severity and duration of symptoms, nonoperative treatment is usually initiated, starting with least invasive modalities such as activity modification and a trial of night splints and NSAIDs.  * Cortisone injections are considered to decrease swelling and inflammation within the carpal tunnel and compression of the nerve.   * Lastly, carpal tunnel release should symptoms persist despite trial of nonoperative treatment, or in cases of severe carpal tunnel syndrome.  * risks of surgery, including, but not limited to: bleeding, infection, pain, scar, damage to adjacent structures (nerves, vessels, tendons), temporary versus permanent nerve injury, failure to relieve symptoms, recurrence of symptoms, incomplete release, stiffness, scar sensitivity and tenderness, need for further surgery, risks of anesthesia were discussed.  * in some cases, with severe, prolonged symptoms, or in situations of underlying peripheral neuropathy, there may be permanent nerve changes not amenable to surgery, that even with surgery, may not resolve.  * in some cases, it may take 6 months to a year or longer for symptoms to improve or resolve, but even then may not completely resolve.    * plan: bilateral wrist braces, flattened to neutral, at night  * over the counter pain control  * activity modification  * return to clinic as needed. Recommend face to face visit in future as needed.  * all  patient's questions addressed and answered today.      Sheng Gabriel M.D., M.S.  Dept. of Orthopaedic Surgery  [facility]

## 2020-06-10 ENCOUNTER — TELEPHONE (OUTPATIENT)
Dept: ORTHOPEDICS | Facility: CLINIC | Age: 25
End: 2020-06-10

## 2020-06-10 NOTE — TELEPHONE ENCOUNTER
Reason for call:  Other   Patient called regarding (reason for call): call back  Additional comments: Patient is calling to see if he should come in and get an appointment set up because he has been having trouble with his hands, both hands are exeriencing numbness. Please call to advise.    Phone number to reach patient:  Home number on file [phone] (home)    Best Time:  any    Can we leave a detailed message on this number?  YES    Travel screening: Negative

## 2020-06-10 NOTE — TELEPHONE ENCOUNTER
Returned call to pt and let him know via VM he can call back and schedule a F2F appt per Dr Gabriel's last note. Left call back number to be scheduled.    Kathryn Steinberg RN Specialty Triage 6/10/2020 12:23 PM

## 2020-06-15 ENCOUNTER — OFFICE VISIT (OUTPATIENT)
Dept: ORTHOPEDICS | Facility: CLINIC | Age: 25
End: 2020-06-15
Payer: COMMERCIAL

## 2020-06-15 ENCOUNTER — ANCILLARY PROCEDURE (OUTPATIENT)
Dept: GENERAL RADIOLOGY | Facility: CLINIC | Age: 25
End: 2020-06-15
Attending: PHYSICIAN ASSISTANT
Payer: COMMERCIAL

## 2020-06-15 VITALS
WEIGHT: 227 LBS | RESPIRATION RATE: 16 BRPM | DIASTOLIC BLOOD PRESSURE: 88 MMHG | BODY MASS INDEX: 30.79 KG/M2 | SYSTOLIC BLOOD PRESSURE: 143 MMHG

## 2020-06-15 DIAGNOSIS — M25.532 LEFT WRIST PAIN: ICD-10-CM

## 2020-06-15 DIAGNOSIS — G56.03 BILATERAL CARPAL TUNNEL SYNDROME: Primary | ICD-10-CM

## 2020-06-15 PROCEDURE — 99213 OFFICE O/P EST LOW 20 MIN: CPT | Mod: 25 | Performed by: ORTHOPAEDIC SURGERY

## 2020-06-15 PROCEDURE — 20526 THER INJECTION CARP TUNNEL: CPT | Mod: RT | Performed by: ORTHOPAEDIC SURGERY

## 2020-06-15 PROCEDURE — 73110 X-RAY EXAM OF WRIST: CPT | Mod: LT

## 2020-06-15 RX ORDER — TRIAMCINOLONE ACETONIDE 40 MG/ML
40 INJECTION, SUSPENSION INTRA-ARTICULAR; INTRAMUSCULAR
Status: SHIPPED | OUTPATIENT
Start: 2020-06-15

## 2020-06-15 RX ORDER — LIDOCAINE HYDROCHLORIDE 10 MG/ML
1 INJECTION, SOLUTION INFILTRATION; PERINEURAL
Status: SHIPPED | OUTPATIENT
Start: 2020-06-15

## 2020-06-15 RX ADMIN — TRIAMCINOLONE ACETONIDE 40 MG: 40 INJECTION, SUSPENSION INTRA-ARTICULAR; INTRAMUSCULAR at 14:22

## 2020-06-15 RX ADMIN — LIDOCAINE HYDROCHLORIDE 1 ML: 10 INJECTION, SOLUTION INFILTRATION; PERINEURAL at 14:22

## 2020-06-15 ASSESSMENT — PAIN SCALES - GENERAL: PAINLEVEL: SEVERE PAIN (7)

## 2020-06-15 NOTE — PROGRESS NOTES
"  CHIEF COMPLAINT:   Chief Complaint   Patient presents with     Wrist Pain     Left wrist pain and into the base of thumb. left hand dominant. His right ulnar three finger numbness has changed to just being the long finger and the whole finger is numb. he has bee working 50-60 hr weeks [employer]. he has worn wrist braces and elbow braces as originally it was acting like ulnar neuropathy.         HISTORY:  Navin Thomas is a 25 year old male , Left -hand dominant who is seen in for right hand numbness and tingling and pain for about 2 months, left wrist pain.  Right hand started to go numb, random times at the start, but more consistent with returning to work in [employer], 50-60 hours per week. Now more in the mornings and evenings, seems better during the day.  Started in the small, ring and middle finger and then the whole hand. Now really just the entire right long finger is numb. Was told carpal tunnel syndrome, given a wrist brace. Was then told ulnar nerve so got an elbow wrap. Ibuprofen 3-4Xday, 600mg.  Was also starting small, ring, middle finger on left hand but not now. Has more pain in the left wrist than anything now, the past couple of weeks. Some stiffness in the neck, thinks stress and work.     Twisting the hand/wrist can cause shooting pain to the fingers. When numbness is really bad, feels like weakness.    Occasionally bothers at night. Sore/stiff in the morning and entire hand is numb. Squeezing hand/fingers seems to help \"get blood flow\" back but stiffness lasts about 2 hours.    Wrist brace didn't help. Wrapping the elbow seemed to help early on but not now.     Denies prior numbness and tingling problems in the past.        Suspected cause: work in [employer].  Pain severity: 7/10  Pain quality: dull, aching and pins and needles  Frequency of symptoms: are constant.  Aggravating Factors: activities.  Relieving Factors: rest.  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      Other PMH:  has a past medical history of NO ACTIVE PROBLEMS.  Patient Active Problem List   Diagnosis   (none) - all problems resolved or deleted       Surgical Hx:  has no past surgical history on file.    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 He has never used smokeless tobacco. He reports that he does not drink alcohol or use drugs.    Family Hx: family history includes Hearing Loss in his father, paternal grandfather, and paternal grandmother; Hypertension in his father; Lipids in his father.. Negative for bleeding/clotting disorders. Negative for adverse anesthesia reactions.    REVIEW OF SYSTEMS:   CONSTITUTIONAL:NEGATIVE for fever, chills, change in weight  INTEGUMENTARY/SKIN: NEGATIVE for worrisome rashes, moles or lesions  MUSCULOSKELETAL:See HPI above  Neurology: see HPI above.      EXAM:  BP (!) 143/88   Resp 16   Wt 103 kg (227 lb)   BMI 30.79 kg/m    GENERAL APPEARANCE: healthy, alert and no distress   SKIN: no suspicious lesions or rashes  RESPIRATORY: No increased work of breathing.  NEURO:    Thenar atrophy: none visible.   PSYCH:  mentation appears normal and affect normal, not anxious.    MUSCULOSKELETAL:    RIGHT HAND/FINGERS:    Skin intact. No abnormal skin discoloration, erythema or ecchymosis.   No nail pitting or clubbing.  Normal wear pattern, color and tone.  No observable or palpable masses of the fingers or palm or wrist.  No visible triggering of fingers.   No observable or palpable cords or nodules of the fingers or palm.    There is no visible swelling in the wrist, hand, forearm.  There is  nontender to palpation right wrist, forearm  There is no ecchymosis.  There is no erythema of the surrounding skin.  There is no maceration of the skin.  There is no deformity in the area.  Intact extensors. No extensor lag.    Special tests wrist:    Tinel's positive,    Phalen's positive.    Flexion/compression test positive.      Special tests medial elbow ulnar nerve:    Tinel's negative,    Flexion/compression test negative.    Special tests median nerve proximal forearm:    Tinel's negative.      Intact epl fpl fdp edc wrist flexion/extension biceps/triceps deltoid      LEFT HAND/FINGERS:    Skin intact. No abnormal skin discoloration, erythema or ecchymosis.   No nail pitting or clubbing.  Normal wear pattern, color and tone.  No observable or palpable masses of the fingers or palm or wrist.  No visible triggering of fingers.   No observable or palpable cords or nodules of the fingers or palm.  Positive watsons scaphoid shift test.    There is no visible swelling in the wrist, hand, forearm.  There is tender to palpation over the FCR tendon and insertion volar wrist, scaphoid tubercle  There is no ecchymosis.  There is no erythema of the surrounding skin.  There is no maceration of the skin.  There is no gross deformity in the area.  Intact extensors. No extensor lag.    Special tests wrist:    Tinel's equivocal.   Phalen's positive.    Flexion/compression test mild positive.    Special tests medial elbow ulnar nerve:    Tinel's negative,    Flexion/compression test negative.    Intact epl fpl fdp edc wrist flexion/extension biceps/triceps deltoid        XRAYS: 3 views left wrist 6/15/2020 reviewed. No obvious fracture or dislocation. No obvious bony abnormality or lesion.    SPECIAL STUDIES:  EMG: none.      ASSESSMENT/PLAN: 25yo RHD male with:  1)  bilateral hand pain, numbness and tingling likely carpal tunnel syndrome.  2) left wrist strain.    * discussed with patient signs and symptoms somewhat  consistent with carpal tunnel syndrome. Carpal tunnel syndrome is compression or pinching of the median nerve at the wrist, as it enters the hand. There are many different causes, and in most cases, multifactorial.  * based on self-provocative tests, appears more consistent with carpal tunnel syndrome, rather than cubital tunnel with ulnar nerve symptoms at the elbow.    * An indepth discussion was had with him about the options for treatment, which included activity modification to avoid aggravating activities, taking breaks during activities that cause symptoms, stretching, NSAIDS to help decrease inflammation and swelling within the carpal tunnel, night splinting, corticosteroid injections, and carpal tunnel release.   * depending upon severity and duration of symptoms, nonoperative treatment is usually initiated, starting with least invasive modalities such as activity modification and a trial of night splints and NSAIDs.  * Cortisone injections are considered to decrease swelling and inflammation within the carpal tunnel and compression of the nerve.   * Lastly, carpal tunnel release should symptoms persist despite trial of nonoperative treatment, or in cases of severe carpal tunnel syndrome.  * risks of surgery, including, but not limited to: bleeding, infection, pain, scar, damage to adjacent structures (nerves, vessels, tendons), temporary versus permanent nerve injury, failure to relieve symptoms, recurrence of symptoms, incomplete release, stiffness, scar sensitivity and tenderness, need for further surgery, risks of anesthesia were discussed.  * in some cases, with severe, prolonged symptoms, or in situations of underlying peripheral neuropathy, there may be permanent nerve changes not amenable to surgery, that even with surgery, may not resolve.  * in some cases, it may take 6 months to a year or longer for symptoms to improve or resolve, but even then may not completely resolve.    * plan: right carpal  tunnel injection  * bilateral wrist braces, at night, left wrist brace during the day with activities.  * over the counter pain control  * activity modification  * return to clinic as needed.  Consider EMG in future as needed. Consider left wrist arthrogram if continues with pain.  * all patient's questions addressed and answered today.      Sheng Gabriel M.D., M.S.  Dept. of Orthopaedic Surgery  [facility]    Hand / Upper Extremity Injection/Arthrocentesis: R carpal tunnel    Date/Time: 6/15/2020 2:22 PM  Performed by: Andres Somers PA  Authorized by: Sheng Gabriel MD     Indications:  Pain  Needle Size:  25 G  Guidance: landmark    Approach:  Volar  Condition: carpal tunnel      Site:  R carpal tunnel  Medications:  1 mL lidocaine 1 %; 40 mg triamcinolone 40 MG/ML  Outcome:  Tolerated well, no immediate complications  Procedure discussed: discussed risks, benefits, and alternatives    Consent Given by:  Patient  Prep: patient was prepped and draped in usual sterile fashion

## 2020-06-15 NOTE — LETTER
"    6/15/2020         RE: Navin Thomas  [address] [place] [address]        Dear Colleague,    Thank you for referring your patient, Navin Thomas, to the [facility] [place]. Please see a copy of my visit note below.      CHIEF COMPLAINT:   Chief Complaint   Patient presents with     Wrist Pain     Left wrist pain and into the base of thumb. left hand dominant. His right ulnar three finger numbness has changed to just being the long finger and the whole finger is numb. he has bee working 50-60 hr weeks [employer]. he has worn wrist braces and elbow braces as originally it was acting like ulnar neuropathy.         HISTORY:  Navin Thomas is a 25 year old male , Left -hand dominant who is seen in for right hand numbness and tingling and pain for about 2 months, left wrist pain.  Right hand started to go numb, random times at the start, but more consistent with returning to work in [employer], 50-60 hours per week. Now more in the mornings and evenings, seems better during the day.  Started in the small, ring and middle finger and then the whole hand. Now really just the entire right long finger is numb. Was told carpal tunnel syndrome, given a wrist brace. Was then told ulnar nerve so got an elbow wrap. Ibuprofen 3-4Xday, 600mg.  Was also starting small, ring, middle finger on left hand but not now. Has more pain in the left wrist than anything now, the past couple of weeks. Some stiffness in the neck, thinks stress and work.     Twisting the hand/wrist can cause shooting pain to the fingers. When numbness is really bad, feels like weakness.    Occasionally bothers at night. Sore/stiff in the morning and entire hand is numb. Squeezing hand/fingers seems to help \"get blood flow\" back but stiffness lasts about 2 hours.    Wrist brace didn't help. Wrapping the elbow seemed to help early on but not now.     Denies prior numbness and tingling problems in the " past.        Suspected cause: work in [employer].  Pain severity: 7/10  Pain quality: dull, aching and pins and needles  Frequency of symptoms: are constant.  Aggravating Factors: activities.  Relieving Factors: rest.  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      Other PMH:  has a past medical history of NO ACTIVE PROBLEMS.  Patient Active Problem List   Diagnosis   (none) - all problems resolved or deleted       Surgical Hx:  has no past surgical history on file.    Medications:   Current Outpatient Medications:      ibuprofen (ADVIL/MOTRIN) 200 MG tablet, Take 200 mg by mouth every 4 hours as needed for mild pain, Disp: , Rfl:     Allergies:   Allergies   Allergen Reactions     Aloe Itching       Social Hx: [employer].  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 He has never used smokeless tobacco. He reports that he does not drink alcohol or use drugs.    Family Hx: family history includes Hearing Loss in his father, paternal grandfather, and paternal grandmother; Hypertension in his father; Lipids in his father.. Negative for bleeding/clotting disorders. Negative for adverse anesthesia reactions.    REVIEW OF SYSTEMS:   CONSTITUTIONAL:NEGATIVE for fever, chills, change in weight  INTEGUMENTARY/SKIN: NEGATIVE for worrisome rashes, moles or lesions  MUSCULOSKELETAL:See HPI above  Neurology: see HPI above.      EXAM:  BP (!) 143/88   Resp 16   Wt 103 kg (227 lb)   BMI 30.79 kg/m    GENERAL APPEARANCE: healthy, alert and no distress   SKIN: no suspicious lesions or rashes  RESPIRATORY: No increased work of breathing.  NEURO:    Thenar atrophy: none visible.   PSYCH:  mentation appears normal and affect normal, not anxious.    MUSCULOSKELETAL:    RIGHT HAND/FINGERS:    Skin intact. No abnormal skin discoloration, erythema or ecchymosis.   No nail pitting or clubbing.  Normal  wear pattern, color and tone.  No observable or palpable masses of the fingers or palm or wrist.  No visible triggering of fingers.   No observable or palpable cords or nodules of the fingers or palm.    There is no visible swelling in the wrist, hand, forearm.  There is nontender to palpation right wrist, forearm  There is no ecchymosis.  There is no erythema of the surrounding skin.  There is no maceration of the skin.  There is no deformity in the area.  Intact extensors. No extensor lag.    Special tests wrist:    Tinel's positive,    Phalen's positive.    Flexion/compression test positive.      Special tests medial elbow ulnar nerve:    Tinel's negative,    Flexion/compression test negative.    Special tests median nerve proximal forearm:    Tinel's negative.      Intact epl fpl fdp edc wrist flexion/extension biceps/triceps deltoid      LEFT HAND/FINGERS:    Skin intact. No abnormal skin discoloration, erythema or ecchymosis.   No nail pitting or clubbing.  Normal wear pattern, color and tone.  No observable or palpable masses of the fingers or palm or wrist.  No visible triggering of fingers.   No observable or palpable cords or nodules of the fingers or palm.  Positive watsons scaphoid shift test.    There is no visible swelling in the wrist, hand, forearm.  There is tender to palpation over the FCR tendon and insertion volar wrist, scaphoid tubercle  There is no ecchymosis.  There is no erythema of the surrounding skin.  There is no maceration of the skin.  There is no gross deformity in the area.  Intact extensors. No extensor lag.    Special tests wrist:    Tinel's equivocal.   Phalen's positive.    Flexion/compression test mild positive.    Special tests medial elbow ulnar nerve:    Tinel's negative,    Flexion/compression test negative.    Intact epl fpl fdp edc wrist flexion/extension biceps/triceps deltoid        XRAYS: 3 views left wrist 6/15/2020 reviewed. No obvious fracture or dislocation. No  obvious bony abnormality or lesion.    SPECIAL STUDIES:  EMG: none.      ASSESSMENT/PLAN: 23yo RHD male with:  1)  bilateral hand pain, numbness and tingling likely carpal tunnel syndrome.  2) left wrist strain.    * discussed with patient signs and symptoms somewhat consistent with carpal tunnel syndrome. Carpal tunnel syndrome is compression or pinching of the median nerve at the wrist, as it enters the hand. There are many different causes, and in most cases, multifactorial.  * based on self-provocative tests, appears more consistent with carpal tunnel syndrome, rather than cubital tunnel with ulnar nerve symptoms at the elbow.    * An indepth discussion was had with him about the options for treatment, which included activity modification to avoid aggravating activities, taking breaks during activities that cause symptoms, stretching, NSAIDS to help decrease inflammation and swelling within the carpal tunnel, night splinting, corticosteroid injections, and carpal tunnel release.   * depending upon severity and duration of symptoms, nonoperative treatment is usually initiated, starting with least invasive modalities such as activity modification and a trial of night splints and NSAIDs.  * Cortisone injections are considered to decrease swelling and inflammation within the carpal tunnel and compression of the nerve.   * Lastly, carpal tunnel release should symptoms persist despite trial of nonoperative treatment, or in cases of severe carpal tunnel syndrome.  * risks of surgery, including, but not limited to: bleeding, infection, pain, scar, damage to adjacent structures (nerves, vessels, tendons), temporary versus permanent nerve injury, failure to relieve symptoms, recurrence of symptoms, incomplete release, stiffness, scar sensitivity and tenderness, need for further surgery, risks of anesthesia were discussed.  * in some cases, with severe, prolonged symptoms, or in situations of underlying peripheral  neuropathy, there may be permanent nerve changes not amenable to surgery, that even with surgery, may not resolve.  * in some cases, it may take 6 months to a year or longer for symptoms to improve or resolve, but even then may not completely resolve.    * plan: right carpal tunnel injection  * bilateral wrist braces, at night, left wrist brace during the day with activities.  * over the counter pain control  * activity modification  * return to clinic as needed.  Consider EMG in future as needed. Consider left wrist arthrogram if continues with pain.  * all patient's questions addressed and answered today.      Sheng Gabriel M.D., M.S.  Dept. of Orthopaedic Surgery  [facility]    Hand / Upper Extremity Injection/Arthrocentesis: R carpal tunnel    Date/Time: 6/15/2020 2:22 PM  Performed by: Andres Somers PA  Authorized by: Sheng Gabriel MD     Indications:  Pain  Needle Size:  25 G  Guidance: landmark    Approach:  Volar  Condition: carpal tunnel      Site:  R carpal tunnel  Medications:  1 mL lidocaine 1 %; 40 mg triamcinolone 40 MG/ML  Outcome:  Tolerated well, no immediate complications  Procedure discussed: discussed risks, benefits, and alternatives    Consent Given by:  Patient  Prep: patient was prepped and draped in usual sterile fashion              Again, thank you for allowing me to participate in the care of your patient.        Sincerely,        Sheng Gabriel MD

## 2020-06-15 NOTE — LETTER
[facility] [place]  [address]  [place] [address] [phone]      WORKABILITY    [facility], Dr. Sheng Gabriel M.D., Andres Somers PA-C  [facility] [place]        6/15/2020      RE: Navin Thomas    [address]  [place] [address]        To whom it may concern:     Navin Thomas is under my professional care for   1. Left wrist pain    . 2. Right carpal tunnel syndrome       Return to work date: 6/16/20   Mr. Thomas can return to work on 6/16/20. Please excuse for 6/15/20.    Next appointment: As needed        Andres Somers PA-C, CAQ (Ortho)  Supervising Physician: Sheng Gabriel M.D., M.S.  Dept. of Orthopaedic Surgery  [facility]

## 2020-12-20 ENCOUNTER — HEALTH MAINTENANCE LETTER (OUTPATIENT)
Age: 25
End: 2020-12-20

## 2021-03-30 ENCOUNTER — IMMUNIZATION (OUTPATIENT)
Dept: NURSING | Facility: CLINIC | Age: 26
End: 2021-03-30
Payer: COMMERCIAL

## 2021-03-30 PROCEDURE — 0001A PR COVID VAC PFIZER DIL RECON 30 MCG/0.3 ML IM: CPT

## 2021-03-30 PROCEDURE — 91300 PR COVID VAC PFIZER DIL RECON 30 MCG/0.3 ML IM: CPT

## 2021-04-18 ENCOUNTER — HEALTH MAINTENANCE LETTER (OUTPATIENT)
Age: 26
End: 2021-04-18

## 2021-04-20 ENCOUNTER — IMMUNIZATION (OUTPATIENT)
Dept: NURSING | Facility: CLINIC | Age: 26
End: 2021-04-20
Attending: INTERNAL MEDICINE
Payer: COMMERCIAL

## 2021-04-20 PROCEDURE — 91300 PR COVID VAC PFIZER DIL RECON 30 MCG/0.3 ML IM: CPT

## 2021-04-20 PROCEDURE — 0002A PR COVID VAC PFIZER DIL RECON 30 MCG/0.3 ML IM: CPT

## 2021-06-18 ENCOUNTER — TRANSFERRED RECORDS (OUTPATIENT)
Dept: HEALTH INFORMATION MANAGEMENT | Facility: CLINIC | Age: 26
End: 2021-06-18

## 2021-06-25 ENCOUNTER — TRANSFERRED RECORDS (OUTPATIENT)
Dept: HEALTH INFORMATION MANAGEMENT | Facility: CLINIC | Age: 26
End: 2021-06-25

## 2021-10-03 ENCOUNTER — HEALTH MAINTENANCE LETTER (OUTPATIENT)
Age: 26
End: 2021-10-03

## 2021-10-21 ENCOUNTER — OFFICE VISIT (OUTPATIENT)
Dept: URGENT CARE | Facility: URGENT CARE | Age: 26
End: 2021-10-21
Payer: COMMERCIAL

## 2021-10-21 VITALS
SYSTOLIC BLOOD PRESSURE: 120 MMHG | OXYGEN SATURATION: 98 % | DIASTOLIC BLOOD PRESSURE: 80 MMHG | HEART RATE: 76 BPM | TEMPERATURE: 98.3 F

## 2021-10-21 DIAGNOSIS — R07.0 THROAT PAIN: ICD-10-CM

## 2021-10-21 DIAGNOSIS — J02.9 PHARYNGITIS, UNSPECIFIED ETIOLOGY: Primary | ICD-10-CM

## 2021-10-21 LAB
DEPRECATED S PYO AG THROAT QL EIA: NEGATIVE
GROUP A STREP BY PCR: NOT DETECTED

## 2021-10-21 PROCEDURE — 87651 STREP A DNA AMP PROBE: CPT | Performed by: PHYSICIAN ASSISTANT

## 2021-10-21 PROCEDURE — 99213 OFFICE O/P EST LOW 20 MIN: CPT | Performed by: PHYSICIAN ASSISTANT

## 2021-10-21 RX ORDER — BENZONATATE 100 MG/1
100 CAPSULE ORAL 3 TIMES DAILY PRN
Qty: 30 CAPSULE | Refills: 0 | Status: SHIPPED | OUTPATIENT
Start: 2021-10-21 | End: 2021-10-31

## 2021-10-21 NOTE — PROGRESS NOTES
Throat pain  - Streptococcus A Rapid Screen w/Reflex to PCR - Clinic Collect  - Group A Streptococcus PCR Throat Swab    Pharyngitis, unspecified etiology  - benzonatate (TESSALON) 100 MG capsule; Take 1 capsule (100 mg) by mouth 3 times daily as needed for cough    20 minutes spent on the date of the encounter doing chart review, history and exam, documentation and further activities per the note    [facility]    Patient Instructions     Patient Education     Self-Care for Sore Throats     Sore throats happen for many reasons, such as colds, allergies, cigarette smoke, air pollution, and infections caused by viruses or bacteria. In any case, your throat becomes red and sore. Your goal for self-care is to reduce your discomfort while giving your throat a chance to heal.  Moisten and soothe your throat  Tips include the following:    Try a sip of water first thing after waking up.    Keep your throat moist by drinking 6 or more glasses of clear liquids every day.    Run a cool-air humidifier in your room overnight.    Stay away from cigarette smoke.     Check the air quality index,if air pollution gives you a sore throat. On high pollution days, try to limit outdoor time.    Suck on throat lozenges, cough drops, hard candy, ice chips, or frozen fruit-juice bars. Use the sugar-free versions if your diet or medical condition requires them.  Gargle to ease irritation  Gargling every hour or 2 can ease irritation. Try gargling with 1 of these solutions:    1/4 teaspoon of salt in 1/2 cup of warm water    An over-the-counter anesthetic gargle  Use medicine for more relief  Over-the-counter medicine can reduce sore throat symptoms. Ask your pharmacist if you have questions about which medicine to use. To prevent possible medicine interactions, let the pharmacist know what medicines you take. To decrease symptoms:    Ease pain with anesthetic sprays. Aspirin or an aspirin  substitute also helps. Remember, never give aspirin to anyone 18 or younger. Don't take aspirin if you are already taking blood thinners.     For sore throats caused by allergies, try antihistamines to block the allergic reaction.  Unless a sore throat is caused by a bacterial infection, antibiotics won t help you.  Prevent future sore throats  Prevention tips include:    Stop smoking or reduce contact with secondhand smoke. Smoke irritates the tender throat lining.    Limit contact with pets and with allergy-causing substances, such as pollen and mold.    Wash your hands often when you re around someone with a sore throat or cold. This will keep viruses or bacteria from spreading.    Limit outdoor time when air pollution is bad.    Don t strain your vocal cords.  When to call your healthcare provider  Contact your healthcare provider if you have:    Fever of 100.4 F (38.0 C) or higher, or as directed by your healthcare provider    White spots on the throat    Great Trouble swallowing    A skin rash    Recent exposure to someone else with strep bacteria    Severe hoarseness and swollen glands in the neck or jaw  Call 911  Call 911 if any of the following occur:    Trouble breathing or catching your breath    Drooling and problems swallowing    Wheezing    Unable to talk    Feeling dizzy or faint    Feeling of doom  [employer] last reviewed this educational content on 9/1/2019 2000-2021 [employer]. All rights reserved. This information is not intended as a substitute for professional medical care. Always follow your healthcare professional's instructions.                 Subjective   26 year old who presents to clinic today for the following health issues:    Urgent Care and Pharyngitis       HPI     Acute Illness  Acute illness concerns: had a fever yesterday, fatigue, diarrhea, had body aches, neck aches, sore throat, tested negative for covid on tuesday.  Onset/Duration: Monday evening    Symptoms:  Fever: YES  Chills/Sweats: YES  Headache (location?): YES  Sinus Pressure: no  Conjunctivitis:  no  Ear Pain: no  Rhinorrhea: no  Congestion: no  Sore Throat: YES (6-7/10)  Cough: no  Wheeze: no  Decreased Appetite: YES  Nausea: no  Vomiting: no  Diarrhea: YES - No abdominal pain.   Dysuria/Freq.: no  Dysuria or Hematuria: no  Fatigue/Achiness: YES  Sick/Strep Exposure: None known   Therapies tried and outcome: Nyquil, ibuprofen, Tylenol, imodium, cough drops.     Review of Systems   Review of Systems   See HPI     Objective    Temp: 98.3  F (36.8  C) Temp src: Tympanic BP: 120/80 Pulse: 76     SpO2: 98 %       Physical Exam   Physical Exam  Constitutional:       General: He is not in acute distress.     Appearance: Normal appearance. He is normal weight. He is not ill-appearing, toxic-appearing or diaphoretic.   HENT:      Head: Normocephalic and atraumatic.      Right Ear: Tympanic membrane, ear canal and external ear normal. There is no impacted cerumen.      Left Ear: Tympanic membrane, ear canal and external ear normal. There is no impacted cerumen.      Nose: Nose normal. No congestion or rhinorrhea.      Mouth/Throat:      Mouth: Mucous membranes are moist.      Pharynx: Oropharynx is clear. Posterior oropharyngeal erythema present. No oropharyngeal exudate.   Cardiovascular:      Rate and Rhythm: Normal rate and regular rhythm.      Pulses: Normal pulses.      Heart sounds: Normal heart sounds. No murmur heard.   No friction rub. No gallop.    Pulmonary:      Effort: Pulmonary effort is normal. No respiratory distress.      Breath sounds: Normal breath sounds. No stridor. No wheezing, rhonchi or rales.   Chest:      Chest wall: No tenderness.   Musculoskeletal:      Cervical back: Normal range of motion and neck supple. No tenderness.   Lymphadenopathy:      Cervical: No cervical adenopathy.   Neurological:      General: No focal deficit present.      Mental Status: He is alert and oriented to  person, place, and time. Mental status is at baseline.      Gait: Gait normal.   Psychiatric:         Mood and Affect: Mood normal.         Behavior: Behavior normal.         Thought Content: Thought content normal.         Judgment: Judgment normal.          Results for orders placed or performed in visit on 10/21/21 (from the past 24 hour(s))   Streptococcus A Rapid Screen w/Reflex to PCR - Clinic Collect    Specimen: Throat; Swab   Result Value Ref Range    Group A Strep antigen Negative Negative

## 2021-10-21 NOTE — LETTER
October 21, 2021      To Whom It May Concern:      Navin Thomas was seen in our urgent care today, 10/21/21.  I expect his condition to improve over the next 2-5 days.  He may return to school when improved.    Sincerely,        Herve Cifuentes PA-C

## 2021-10-21 NOTE — PATIENT INSTRUCTIONS
Patient Education     Self-Care for Sore Throats     Sore throats happen for many reasons, such as colds, allergies, cigarette smoke, air pollution, and infections caused by viruses or bacteria. In any case, your throat becomes red and sore. Your goal for self-care is to reduce your discomfort while giving your throat a chance to heal.  Moisten and soothe your throat  Tips include the following:    Try a sip of water first thing after waking up.    Keep your throat moist by drinking 6 or more glasses of clear liquids every day.    Run a cool-air humidifier in your room overnight.    Stay away from cigarette smoke.     Check the air quality index,if air pollution gives you a sore throat. On high pollution days, try to limit outdoor time.    Suck on throat lozenges, cough drops, hard candy, ice chips, or frozen fruit-juice bars. Use the sugar-free versions if your diet or medical condition requires them.  Gargle to ease irritation  Gargling every hour or 2 can ease irritation. Try gargling with 1 of these solutions:    1/4 teaspoon of salt in 1/2 cup of warm water    An over-the-counter anesthetic gargle  Use medicine for more relief  Over-the-counter medicine can reduce sore throat symptoms. Ask your pharmacist if you have questions about which medicine to use. To prevent possible medicine interactions, let the pharmacist know what medicines you take. To decrease symptoms:    Ease pain with anesthetic sprays. Aspirin or an aspirin substitute also helps. Remember, never give aspirin to anyone 18 or younger. Don't take aspirin if you are already taking blood thinners.     For sore throats caused by allergies, try antihistamines to block the allergic reaction.  Unless a sore throat is caused by a bacterial infection, antibiotics won t help you.  Prevent future sore throats  Prevention tips include:    Stop smoking or reduce contact with secondhand smoke. Smoke irritates the tender throat lining.    Limit contact with  pets and with allergy-causing substances, such as pollen and mold.    Wash your hands often when you re around someone with a sore throat or cold. This will keep viruses or bacteria from spreading.    Limit outdoor time when air pollution is bad.    Don t strain your vocal cords.  When to call your healthcare provider  Contact your healthcare provider if you have:    Fever of 100.4 F (38.0 C) or higher, or as directed by your healthcare provider    White spots on the throat    Great Trouble swallowing    A skin rash    Recent exposure to someone else with strep bacteria    Severe hoarseness and swollen glands in the neck or jaw  Call 911  Call 911 if any of the following occur:    Trouble breathing or catching your breath    Drooling and problems swallowing    Wheezing    Unable to talk    Feeling dizzy or faint    Feeling of doom  [employer] last reviewed this educational content on 9/1/2019 2000-2021 [employer]. All rights reserved. This information is not intended as a substitute for professional medical care. Always follow your healthcare professional's instructions.

## 2022-05-15 ENCOUNTER — HEALTH MAINTENANCE LETTER (OUTPATIENT)
Age: 27
End: 2022-05-15

## 2022-09-10 ENCOUNTER — HEALTH MAINTENANCE LETTER (OUTPATIENT)
Age: 27
End: 2022-09-10

## 2023-06-01 ENCOUNTER — NURSE TRIAGE (OUTPATIENT)
Dept: NURSING | Facility: CLINIC | Age: 28
End: 2023-06-01
Payer: COMMERCIAL
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
## 2023-06-03 ENCOUNTER — HEALTH MAINTENANCE LETTER (OUTPATIENT)
Age: 28
End: 2023-06-03